# Patient Record
Sex: FEMALE | Race: WHITE | Employment: FULL TIME | URBAN - METROPOLITAN AREA
[De-identification: names, ages, dates, MRNs, and addresses within clinical notes are randomized per-mention and may not be internally consistent; named-entity substitution may affect disease eponyms.]

---

## 2017-01-24 ENCOUNTER — OFFICE VISIT (OUTPATIENT)
Dept: FAMILY MEDICINE | Facility: CLINIC | Age: 52
End: 2017-01-24
Payer: COMMERCIAL

## 2017-01-24 VITALS
HEART RATE: 57 BPM | DIASTOLIC BLOOD PRESSURE: 86 MMHG | RESPIRATION RATE: 10 BRPM | WEIGHT: 167 LBS | OXYGEN SATURATION: 98 % | BODY MASS INDEX: 25.31 KG/M2 | HEIGHT: 68 IN | SYSTOLIC BLOOD PRESSURE: 138 MMHG | TEMPERATURE: 98 F

## 2017-01-24 DIAGNOSIS — Z13.6 CARDIOVASCULAR SCREENING; LDL GOAL LESS THAN 160: ICD-10-CM

## 2017-01-24 DIAGNOSIS — F41.1 GAD (GENERALIZED ANXIETY DISORDER): ICD-10-CM

## 2017-01-24 DIAGNOSIS — Z13.1 SCREENING FOR DIABETES MELLITUS: ICD-10-CM

## 2017-01-24 DIAGNOSIS — Z00.00 ENCOUNTER FOR ROUTINE ADULT HEALTH EXAMINATION WITHOUT ABNORMAL FINDINGS: Primary | ICD-10-CM

## 2017-01-24 DIAGNOSIS — F33.0 MAJOR DEPRESSIVE DISORDER, RECURRENT EPISODE, MILD (H): ICD-10-CM

## 2017-01-24 DIAGNOSIS — Z12.31 ENCOUNTER FOR SCREENING MAMMOGRAM FOR BREAST CANCER: ICD-10-CM

## 2017-01-24 LAB
ALBUMIN SERPL-MCNC: 3.6 G/DL (ref 3.4–5)
ALP SERPL-CCNC: 85 U/L (ref 40–150)
ALT SERPL W P-5'-P-CCNC: 28 U/L (ref 0–50)
ANION GAP SERPL CALCULATED.3IONS-SCNC: 4 MMOL/L (ref 3–14)
AST SERPL W P-5'-P-CCNC: 20 U/L (ref 0–45)
BILIRUB SERPL-MCNC: 0.5 MG/DL (ref 0.2–1.3)
BUN SERPL-MCNC: 8 MG/DL (ref 7–30)
CALCIUM SERPL-MCNC: 8.6 MG/DL (ref 8.5–10.1)
CHLORIDE SERPL-SCNC: 104 MMOL/L (ref 94–109)
CO2 SERPL-SCNC: 32 MMOL/L (ref 20–32)
CREAT SERPL-MCNC: 0.68 MG/DL (ref 0.52–1.04)
GFR SERPL CREATININE-BSD FRML MDRD: NORMAL ML/MIN/1.7M2
GLUCOSE SERPL-MCNC: 87 MG/DL (ref 70–99)
POTASSIUM SERPL-SCNC: 3.6 MMOL/L (ref 3.4–5.3)
PROT SERPL-MCNC: 7.5 G/DL (ref 6.8–8.8)
SODIUM SERPL-SCNC: 140 MMOL/L (ref 133–144)

## 2017-01-24 PROCEDURE — 80053 COMPREHEN METABOLIC PANEL: CPT | Performed by: FAMILY MEDICINE

## 2017-01-24 PROCEDURE — 36415 COLL VENOUS BLD VENIPUNCTURE: CPT | Performed by: FAMILY MEDICINE

## 2017-01-24 PROCEDURE — G0145 SCR C/V CYTO,THINLAYER,RESCR: HCPCS | Performed by: FAMILY MEDICINE

## 2017-01-24 PROCEDURE — 99396 PREV VISIT EST AGE 40-64: CPT | Performed by: FAMILY MEDICINE

## 2017-01-24 PROCEDURE — 87624 HPV HI-RISK TYP POOLED RSLT: CPT | Performed by: FAMILY MEDICINE

## 2017-01-24 RX ORDER — HYDROXYZINE HYDROCHLORIDE 25 MG/1
25 TABLET, FILM COATED ORAL
Qty: 60 TABLET | Refills: 3 | Status: SHIPPED | OUTPATIENT
Start: 2017-01-24 | End: 2019-05-13

## 2017-01-24 RX ORDER — ESCITALOPRAM OXALATE 20 MG/1
20 TABLET ORAL DAILY
Qty: 90 TABLET | Refills: 3 | Status: SHIPPED | OUTPATIENT
Start: 2017-01-24 | End: 2018-04-25

## 2017-01-24 RX ORDER — BUPROPION HYDROCHLORIDE 300 MG/1
300 TABLET ORAL EVERY MORNING
Qty: 90 TABLET | Refills: 3 | Status: SHIPPED | OUTPATIENT
Start: 2017-01-24 | End: 2018-03-05

## 2017-01-24 NOTE — PROGRESS NOTES
Quick Note:    Excellent! Please call or sent a Rico message if you have any questions. Marcy Mclaughlin M.D.         ______

## 2017-01-24 NOTE — PATIENT INSTRUCTIONS
Schedule your mammogram.   Try using a Neti pot for your sinuses.       Preventive Health Recommendations  Female Ages 50 - 64    Yearly exam: See your health care provider every year in order to  o Review health changes.   o Discuss preventive care.    o Review your medicines if your doctor has prescribed any.      Get a Pap test every three years (unless you have an abnormal result and your provider advises testing more often).    If you get Pap tests with HPV test, you only need to test every 5 years, unless you have an abnormal result.     You do not need a Pap test if your uterus was removed (hysterectomy) and you have not had cancer.    You should be tested each year for STDs (sexually transmitted diseases) if you're at risk.     Have a mammogram every 1 to 2 years.    Have a colonoscopy at age 50, or have a yearly FIT test (stool test). These exams screen for colon cancer.      Have a cholesterol test every 5 years, or more often if advised.    Have a diabetes test (fasting glucose) every three years. If you are at risk for diabetes, you should have this test more often.     If you are at risk for osteoporosis (brittle bone disease), think about having a bone density scan (DEXA).    Shots: Get a flu shot each year. Get a tetanus shot every 10 years.    Nutrition:     Eat at least 5 servings of fruits and vegetables each day.    Eat whole-grain bread, whole-wheat pasta and brown rice instead of white grains and rice.    Talk to your provider about Calcium and Vitamin D.     Lifestyle    Exercise at least 150 minutes a week (30 minutes a day, 5 days a week). This will help you control your weight and prevent disease.    Limit alcohol to one drink per day.    No smoking.     Wear sunscreen to prevent skin cancer.     See your dentist every six months for an exam and cleaning.    See your eye doctor every 1 to 2 years.

## 2017-01-24 NOTE — PROGRESS NOTES
SUBJECTIVE:     CC: Rehana Llamas is an 51 year old woman who presents for preventive health visit.     Physical  Annual:     Getting at least 3 servings of Calcium per day::  NO    Bi-annual eye exam::  Yes    Dental care twice a year::  NO    Sleep apnea or symptoms of sleep apnea::  None    Diet::  Vegetarian/vegan    Frequency of exercise::  6-7 days/week    Duration of exercise::  45-60 minutes    Taking medications regularly::  Yes    Medication side effects::  None    Additional concerns today::  No    Answers for HPI/ROS submitted by the patient on 1/24/2017   PHQ-2 Depressed: Not at all, Several days  PHQ-2 Score: 1      Patient's sinuses have been giving her problems. She is treating her symptoms with OTC products and remedies.     Patient is not taking K-DUR.      Today's PHQ-2 Score:   PHQ-2 ( 1999 Pfizer) 1/24/2017   Q1: Little interest or pleasure in doing things 0   Q2: Feeling down, depressed or hopeless 1   PHQ-2 Score 1   Little interest or pleasure in doing things Not at all   Feeling down, depressed or hopeless Several days   PHQ-2 Score 1     Abuse: Current or Past(Physical, Sexual or Emotional)- NOT APPLICABLE  Do you feel safe in your environment - NOT APPLICABLE    Social History   Substance Use Topics     Smoking status: Never Smoker      Smokeless tobacco: Never Used     Alcohol Use: Yes      Comment: 1-2 beers per day     The patient does not drink >3 drinks per day nor >7 drinks per week.    Recent Labs   Lab Test  08/09/11   0823   CHOL  136   HDL  61   LDL  61   TRIG  70   CHOLHDLRATIO  2.2   Reviewed orders with patient.  Reviewed health maintenance and updated orders accordingly - Yes    Mammo Decision Support:  Patient over age 50, mutual decision to screen reflected in health maintenance.  Pertinent mammograms are reviewed under the imaging tab.  History of abnormal Pap smear: NO - age 30-65 PAP every 3 years with negative HPV co-testing recommended  All Histories reviewed  and updated in Epic.      ROS:  14 point ROS neg other than the symptoms noted above in the HPI.    This document serves as a record of the services and decisions personally performed and made by Marcy Mclaughlin MD. It was created on his/her behalf by La Drake, trained medical scribe. The creation of this document is based the provider's statements to the medical scribes.    Scribe La Drake, January 24, 2017    BP Readings from Last 3 Encounters:   01/24/17 138/86   04/12/16 120/70   07/20/15 110/64    Wt Readings from Last 3 Encounters:   01/24/17 75.751 kg (167 lb)   04/12/16 73.029 kg (161 lb)   07/20/15 67.813 kg (149 lb 8 oz)         Patient Active Problem List   Diagnosis     CARDIOVASCULAR SCREENING; LDL GOAL LESS THAN 160     Mild major depression (H)     Benign heart murmur     JHONY (generalized anxiety disorder)     Female stress incontinence     Past Surgical History   Procedure Laterality Date     No surgeries         Social History   Substance Use Topics     Smoking status: Never Smoker      Smokeless tobacco: Never Used     Alcohol Use: Yes      Comment: 1-2 beers per day     Family History   Problem Relation Age of Onset     Depression Father      Depression Sister          Current Outpatient Prescriptions   Medication Sig Dispense Refill     escitalopram (LEXAPRO) 20 MG tablet Take 1 tablet (20 mg) by mouth daily 90 tablet 0     buPROPion (WELLBUTRIN XL) 300 MG 24 hr tablet Take 1 tablet (300 mg) by mouth every morning 90 tablet 1     hydrOXYzine (ATARAX) 25 MG tablet Take 1 tablet (25 mg) by mouth nightly as needed for anxiety 60 tablet 0     potassium chloride (K-DUR) 10 MEQ tablet Take 1 tablet (10 mEq) by mouth daily 90 tablet 1     No Known Allergies  Recent Labs   Lab Test  07/30/15   1758  07/27/15   1548  07/20/15   1628   11/18/13   1612  08/09/11   0823   LDL   --    --    --    --    --   61   HDL   --    --    --    --    --   61   TRIG   --    --    --    --    --   70   ALT  "  --    --   26   --    --    --    CR   --    --   0.66   --    --    --    GFRESTIMATED   --    --   >90  Non  GFR Calc     --    --    --    GFRESTBLACK   --    --   >90   GFR Calc     --    --    --    POTASSIUM  3.8  3.1*  3.0*   < >   --    --    TSH   --    --    --    --   1.35  1.42    < > = values in this interval not displayed.      OBJECTIVE:     /86 mmHg  Pulse 57  Temp(Src) 98  F (36.7  C) (Oral)  Resp 10  Ht 1.727 m (5' 8\")  Wt 75.751 kg (167 lb)  BMI 25.40 kg/m2  SpO2 98%   EXAM:   GENERAL: healthy, alert and no distress  EYES: Eyes grossly normal to inspection, PERRL and conjunctivae and sclerae normal  HENT: ear canals and TM's normal, nose and mouth without ulcers or lesions  NECK: no adenopathy, no asymmetry, masses, or scars and thyroid normal to palpation  RESP: lungs clear to auscultation - no rales, rhonchi or wheezes  BREAST: normal without masses, tenderness or nipple discharge and no palpable axillary masses or adenopathy  CV: regular rate and rhythm, normal S1 S2, no S3 or S4, no murmur, click or rub, no peripheral edema and peripheral pulses strong  ABDOMEN: soft, nontender, no hepatosplenomegaly, no masses and bowel sounds normal   (female): normal female external genitalia, normal urethral meatus, vaginal mucosa pink, moist, well rugated  MS: no gross musculoskeletal defects noted, no edema  SKIN: no suspicious lesions or rashes  NEURO: Normal strength and tone, mentation intact and speech normal  PSYCH: mentation appears normal, affect normal/bright    ASSESSMENT/PLAN:     1. Encounter for routine adult health examination without abnormal findings  - MAMMO due-- will get scheduled today  Pap today  Colon cancer screening FIT test due 4/2017    2. Major depressive disorder, recurrent episode, mild (H)  Stable. The current medical regimen is effective;  continue present plan and medications.  - escitalopram (LEXAPRO) 20 MG tablet; Take 1 " "tablet (20 mg) by mouth daily  Dispense: 90 tablet; Refill: 3  - buPROPion (WELLBUTRIN XL) 300 MG 24 hr tablet; Take 1 tablet (300 mg) by mouth every morning  Dispense: 90 tablet; Refill: 3  - Comprehensive metabolic panel    3. JHONY (generalized anxiety disorder)   stable  - hydrOXYzine (ATARAX) 25 MG tablet; Take 1 tablet (25 mg) by mouth nightly as needed for anxiety  Dispense: 60 tablet; Refill: 3    4. CARDIOVASCULAR SCREENING; LDL GOAL LESS THAN 160  Lipids when fasting     5. Screening for diabetes mellitus  Glucose when fasting     6. Encounter for screening mammogram for breast cancer  - MA Screening Digital Bilateral; Future      COUNSELING:  Reviewed preventive health counseling, as reflected in patient instructions  BP Screening:   Last 3 BP Readings:    BP Readings from Last 3 Encounters:   01/24/17 138/86   04/12/16 120/70   07/20/15 110/64   The following was recommended to the patient:  Re-screen BP within a year and recommended lifestyle modifications   reports that she has never smoked. She has never used smokeless tobacco.  Estimated body mass index is 25.4 kg/(m^2) as calculated from the following:    Height as of this encounter: 1.727 m (5' 8\").    Weight as of this encounter: 75.751 kg (167 lb).     Counseling Resources:  ATP IV Guidelines  Pooled Cohorts Equation Calculator  Breast Cancer Risk Calculator  FRAX Risk Assessment  ICSI Preventive Guidelines  Dietary Guidelines for Americans, 2010  [employer]'s MyPlate  ASA Prophylaxis  Lung CA Screening      Patient Instructions   Schedule your mammogram.   Try using a Neti pot for your sinuses.       Preventive Health Recommendations  Female Ages 50 - 64    Yearly exam: See your health care provider every year in order to  o Review health changes.   o Discuss preventive care.    o Review your medicines if your doctor has prescribed any.      Get a Pap test every three years (unless you have an abnormal result and your provider advises testing more " often).    If you get Pap tests with HPV test, you only need to test every 5 years, unless you have an abnormal result.     You do not need a Pap test if your uterus was removed (hysterectomy) and you have not had cancer.    You should be tested each year for STDs (sexually transmitted diseases) if you're at risk.     Have a mammogram every 1 to 2 years.    Have a colonoscopy at age 50, or have a yearly FIT test (stool test). These exams screen for colon cancer.      Have a cholesterol test every 5 years, or more often if advised.    Have a diabetes test (fasting glucose) every three years. If you are at risk for diabetes, you should have this test more often.     If you are at risk for osteoporosis (brittle bone disease), think about having a bone density scan (DEXA).    Shots: Get a flu shot each year. Get a tetanus shot every 10 years.    Nutrition:     Eat at least 5 servings of fruits and vegetables each day.    Eat whole-grain bread, whole-wheat pasta and brown rice instead of white grains and rice.    Talk to your provider about Calcium and Vitamin D.     Lifestyle    Exercise at least 150 minutes a week (30 minutes a day, 5 days a week). This will help you control your weight and prevent disease.    Limit alcohol to one drink per day.    No smoking.     Wear sunscreen to prevent skin cancer.     See your dentist every six months for an exam and cleaning.    See your eye doctor every 1 to 2 years.        The information in this document, created by the medical scribe for me, accurately reflects the services I personally performed and the decisions made by me. I have reviewed and approved this document for accuracy prior to leaving the patient care area. 01/24/2017    Marcy Mclaughlin MD  [facility]

## 2017-01-24 NOTE — MR AVS SNAPSHOT
After Visit Summary   1/24/2017    Rehana Llamas    MRN: [id number]           Patient Information     Date Of Birth          1965        Visit Information        Provider Department      1/24/2017 9:40 AM Marcy Mclaughlin MD [facility]        Today's Diagnoses     Encounter for routine adult health examination without abnormal findings    -  1     Major depressive disorder, recurrent episode, mild (H)         JHONY (generalized anxiety disorder)         CARDIOVASCULAR SCREENING; LDL GOAL LESS THAN 160         Screening for diabetes mellitus         Encounter for screening mammogram for breast cancer           Care Instructions    Schedule your mammogram.   Try using a Neti pot for your sinuses.       Preventive Health Recommendations  Female Ages 50 - 64    Yearly exam: See your health care provider every year in order to  o Review health changes.   o Discuss preventive care.    o Review your medicines if your doctor has prescribed any.      Get a Pap test every three years (unless you have an abnormal result and your provider advises testing more often).    If you get Pap tests with HPV test, you only need to test every 5 years, unless you have an abnormal result.     You do not need a Pap test if your uterus was removed (hysterectomy) and you have not had cancer.    You should be tested each year for STDs (sexually transmitted diseases) if you're at risk.     Have a mammogram every 1 to 2 years.    Have a colonoscopy at age 50, or have a yearly FIT test (stool test). These exams screen for colon cancer.      Have a cholesterol test every 5 years, or more often if advised.    Have a diabetes test (fasting glucose) every three years. If you are at risk for diabetes, you should have this test more often.     If you are at risk for osteoporosis (brittle bone disease), think about having a bone density scan (DEXA).    Shots: Get a flu shot each year. Get a tetanus shot every 10  years.    Nutrition:     Eat at least 5 servings of fruits and vegetables each day.    Eat whole-grain bread, whole-wheat pasta and brown rice instead of white grains and rice.    Talk to your provider about Calcium and Vitamin D.     Lifestyle    Exercise at least 150 minutes a week (30 minutes a day, 5 days a week). This will help you control your weight and prevent disease.    Limit alcohol to one drink per day.    No smoking.     Wear sunscreen to prevent skin cancer.     See your dentist every six months for an exam and cleaning.    See your eye doctor every 1 to 2 years.          Follow-ups after your visit        Your next 10 appointments already scheduled     Feb 04, 2017 10:00 AM   MA SCREENING DIGITAL BILATERAL [facility]    [address] [place] [id number] [phone]           Do not use any powder, lotion or deodorant under your arms or on your breast. If you do, we will ask you to remove it before your exam.  Wear comfortable, two-piece clothing.  If you have any allergies, tell your care team.  Bring any previous mammograms from other facilities or have them mailed to the breast center.              Future tests that were ordered for you today     Open Future Orders        Priority Expected Expires Ordered    MA Screening Digital Bilateral Routine  1/24/2018 1/24/2017            Who to contact     If you have questions or need follow up information about today's clinic visit or your schedule please contact [facility] directly at [phone].  Normal or non-critical lab and imaging results will be communicated to you by MyChart, letter or phone within 4 business days after the clinic has received the results. If you do not hear from us within 7 days, please contact the clinic through MyChart or phone. If you have a critical or abnormal lab result, we will notify you by phone as soon as  "possible.  Submit refill requests through [employer] or call your pharmacy and they will forward the refill request to us. Please allow 3 business days for your refill to be completed.          Additional Information About Your Visit        [employer] Information     [employer] gives you secure access to your electronic health record. If you see a primary care provider, you can also send messages to your care team and make appointments. If you have questions, please call your primary care clinic.  If you do not have a primary care provider, please call [phone] and they will assist you.        Care EveryWhere ID     This is your Care EveryWhere ID. This could be used by other organizations to access your [facility] medical records  [id number]        Your Vitals Were     Pulse Temperature Respirations Height BMI (Body Mass Index) Pulse Oximetry    57 98  F (36.7  C) (Oral) 10 5' 8\" (1.727 m) 25.40 kg/m2 98%       Blood Pressure from Last 3 Encounters:   01/24/17 138/86   04/12/16 120/70   07/20/15 110/64    Weight from Last 3 Encounters:   01/24/17 167 lb (75.751 kg)   04/12/16 161 lb (73.029 kg)   07/20/15 149 lb 8 oz (67.813 kg)              We Performed the Following     Comprehensive metabolic panel          Where to get your medicines      These medications were sent to [facility] - [place], [place] - [address]  [address] [facility] [address]     Phone:  [phone]    - buPROPion 300 MG 24 hr tablet  - escitalopram 20 MG tablet  - hydrOXYzine 25 MG tablet       Primary Care Provider Office Phone # Fax #    Marcy Mclaughlin [phone]       [facility] [address]  [facility] [address]        Thank you!     Thank you for choosing [facility]  for your care. Our goal is always to provide you with excellent care. Hearing back from our patients is one way we can continue to improve our services. Please take a few minutes to complete the written " survey that you may receive in the mail after your visit with us. Thank you!             Your Updated Medication List - Protect others around you: Learn how to safely use, store and throw away your medicines at www.disposemymeds.org.          This list is accurate as of: 1/24/17 10:23 AM.  Always use your most recent med list.                   Brand Name Dispense Instructions for use    buPROPion 300 MG 24 hr tablet    WELLBUTRIN XL    90 tablet    Take 1 tablet (300 mg) by mouth every morning       escitalopram 20 MG tablet    LEXAPRO    90 tablet    Take 1 tablet (20 mg) by mouth daily       hydrOXYzine 25 MG tablet    ATARAX    60 tablet    Take 1 tablet (25 mg) by mouth nightly as needed for anxiety       potassium chloride 10 MEQ tablet    K-TAB,KLOR-CON    90 tablet    Take 1 tablet (10 mEq) by mouth daily

## 2017-01-24 NOTE — NURSING NOTE
"Chief Complaint   Patient presents with     Physical       Initial /86 mmHg  Pulse 57  Temp(Src) 98  F (36.7  C) (Oral)  Resp 10  Ht 5' 8\" (1.727 m)  Wt 167 lb (75.751 kg)  BMI 25.40 kg/m2  SpO2 98% Estimated body mass index is 25.4 kg/(m^2) as calculated from the following:    Height as of this encounter: 5' 8\" (1.727 m).    Weight as of this encounter: 167 lb (75.751 kg).  BP completed using cuff size: geoff Guardado MA     "

## 2017-01-26 LAB
COPATH REPORT: NORMAL
PAP: NORMAL

## 2017-01-27 LAB
FINAL DIAGNOSIS: NORMAL
HPV HR 12 DNA CVX QL NAA+PROBE: NEGATIVE
HPV16 DNA SPEC QL NAA+PROBE: NEGATIVE
HPV18 DNA SPEC QL NAA+PROBE: NEGATIVE
SPECIMEN DESCRIPTION: NORMAL

## 2017-09-07 ENCOUNTER — TELEPHONE (OUTPATIENT)
Dept: FAMILY MEDICINE | Facility: CLINIC | Age: 52
End: 2017-09-07

## 2017-09-07 ENCOUNTER — OFFICE VISIT (OUTPATIENT)
Dept: URGENT CARE | Facility: URGENT CARE | Age: 52
End: 2017-09-07
Payer: COMMERCIAL

## 2017-09-07 VITALS
HEART RATE: 57 BPM | WEIGHT: 162 LBS | HEIGHT: 70 IN | BODY MASS INDEX: 23.19 KG/M2 | DIASTOLIC BLOOD PRESSURE: 84 MMHG | SYSTOLIC BLOOD PRESSURE: 122 MMHG | OXYGEN SATURATION: 98 % | TEMPERATURE: 98.5 F
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
## 2017-09-07 PROCEDURE — 99213 OFFICE O/P EST LOW 20 MIN: CPT | Performed by: NURSE PRACTITIONER

## 2017-09-07 NOTE — MR AVS SNAPSHOT
After Visit Summary   9/7/2017    Rehana Llamas    MRN: [id number]           Patient Information     Date Of Birth          1965        Visit Information        Provider Department      9/7/2017 6:30 PM Malinda Horowitz NP [facility]        Today's Diagnoses     Cat scratch    -  1      Care Instructions      Animal Bites and Scratches     Healthcare provider giving injection in man's arm.     Most bites and scratches from household pets are nothing to worry about. But some bites or scratches can be serious. Others may become infected or pose the risk of rabies. For that reason, it's best to seek medical treatment for all but the most minor bites.  Report severe animal bites to animal control or your local health department.   When to go to the emergency room (ER)  A bite that barely breaks the skin usually isn't cause for concern. But seek emergency medical care if you:    Have a deep puncture wound or badly torn skin    Have redness, swelling, or warmth near the wound    Think you may have a broken bone or other serious injury    The attack was unprovoked and you don't know the animal (rabies must be ruled out)    Are bitten by a domestic cat or a wild animal, such as a skunk, raccoon, or bat    Do not have a spleen or have a weak immune system  What to expect in the ER    The wound will be carefully cleaned and inspected.    X-rays may be taken if deep damage is suspected or to make sure a small piece of the animal's tooth is not left embedded in the wound.    Although not common, infection can occur, especially if you have a cat bite, deep wound, or weak immune system. You may be given antibiotics to help prevent this.    You may be given a tetanus shot if you haven't had one in the past 5 years. If rabies is a concern, you may be given shots to protect you.    If serious tissue or joint damage has been done, you may be referred to a plastic or orthopedic  surgeon.  Follow-up care  You will likely need to see your doctor within a day or two of receiving emergency medical treatment. In the meantime, watch for signs of infection. These include:    Fever of 100.4 F (38 C) or higher, or as directed by your healthcare provider    Swelling    Redness    Pus draining from the wound  Date Last Reviewed: 12/1/2016 2000-2017 [employer]. [address] [place] [address], [place] [address]. All rights reserved. This information is not intended as a substitute for professional medical care. Always follow your healthcare professional's instructions.                Follow-ups after your visit        Who to contact     If you have questions or need follow up information about today's clinic visit or your schedule please contact [facility] directly at [phone].  Normal or non-critical lab and imaging results will be communicated to you by MyChart, letter or phone within 4 business days after the clinic has received the results. If you do not hear from us within 7 days, please contact the clinic through [employer] or phone. If you have a critical or abnormal lab result, we will notify you by phone as soon as possible.  Submit refill requests through [employer] or call your pharmacy and they will forward the refill request to us. Please allow 3 business days for your refill to be completed.          Additional Information About Your Visit        [employer] Information     [employer] gives you secure access to your electronic health record. If you see a primary care provider, you can also send messages to your care team and make appointments. If you have questions, please call your primary care clinic.  If you do not have a primary care provider, please call [phone] and they will assist you.        Care EveryWhere ID     This is your Care EveryWhere ID. This could be used by other organizations to access your [place] medical records  [id number]        Your  "Vitals Were     Pulse Temperature Height Pulse Oximetry BMI (Body Mass Index)       57 98.5  F (36.9  C) (Oral) 5' 10\" (1.778 m) 98% 23.24 kg/m2        Blood Pressure from Last 3 Encounters:   09/07/17 122/84   01/24/17 138/86   04/12/16 120/70    Weight from Last 3 Encounters:   09/07/17 162 lb (73.5 kg)   01/24/17 167 lb (75.8 kg)   04/12/16 161 lb (73 kg)              Today, you had the following     No orders found for display         Today's Medication Changes          These changes are accurate as of: 9/7/17  7:46 PM.  If you have any questions, ask your nurse or doctor.               Start taking these medicines.        Dose/Directions    amoxicillin-clavulanate 875-125 MG per tablet   Commonly known as:  AUGMENTIN   Used for:  Cat scratch   Started by:  Malinda Horowitz NP        Dose:  1 tablet   Take 1 tablet by mouth 2 times daily   Quantity:  20 tablet   Refills:  0            Where to get your medicines      These medications were sent to [facility] [address] [place] [address] AT [address] [address]     Phone:  [phone]     amoxicillin-clavulanate 875-125 MG per tablet                Primary Care Provider Office Phone # Fax #    Marcy Mclaughlin [phone]       [address]  [facility] [address]        Equal Access to Services     CAROLINA ORR AH: Mehreen selfo Sochana, waaxda luqadaha, qaybta kaalmada adeegyada, waxay jenn miller aderazia norris. So [facility] [phone].    ATENCIÓN: Si habla español, tiene a jacob disposición servicios gratuitos de asistencia lingüística. Llame al [phone].    We comply with applicable federal civil rights laws and [place] laws. We do not discriminate on the basis of race, color, national origin, age, disability sex, sexual orientation or gender identity.            Thank you!     Thank you for choosing [facility]  for your care. Our goal is " always to provide you with excellent care. Hearing back from our patients is one way we can continue to improve our services. Please take a few minutes to complete the written survey that you may receive in the mail after your visit with us. Thank you!             Your Updated Medication List - Protect others around you: Learn how to safely use, store and throw away your medicines at www.disposemymeds.org.          This list is accurate as of: 9/7/17  7:46 PM.  Always use your most recent med list.                   Brand Name Dispense Instructions for use Diagnosis    amoxicillin-clavulanate 875-125 MG per tablet    AUGMENTIN    20 tablet    Take 1 tablet by mouth 2 times daily    Cat scratch       buPROPion 300 MG 24 hr tablet    WELLBUTRIN XL    90 tablet    Take 1 tablet (300 mg) by mouth every morning    Major depressive disorder, recurrent episode, mild (H)       escitalopram 20 MG tablet    LEXAPRO    90 tablet    Take 1 tablet (20 mg) by mouth daily    Major depressive disorder, recurrent episode, mild (H)       hydrOXYzine 25 MG tablet    ATARAX    60 tablet    Take 1 tablet (25 mg) by mouth nightly as needed for anxiety    JHONY (generalized anxiety disorder)

## 2017-09-07 NOTE — TELEPHONE ENCOUNTER
"Patient states she has an area on her leg where she has A \"depressed vein\"  Last night her cat scratched her in the middle of the night putting like a claw puncture right in to that vein  Had a lot of bleeding  Was finally able to get the bleeding under control.  Is wondering if she should be seen  Currently no signs of infection, but knows it can turn quickly and that the cat's claw did break the skin    Recommend she should be seen.  Last Tetanus vaccine was 8/9/2011  Patient agrees with plan but is currently at work so will plan to go to  this evening at the [facility].  Trudy Alcaraz RN     "

## 2017-09-08 NOTE — NURSING NOTE
"Chief Complaint   Patient presents with     Urgent Care     Laceration     1AM today turned over in bed, her own cat was laying beside her, pt got up to go to BR and suddenly noted blood squirting from right upper outer thigh.  Most recent tetanus vaccination was 8/2011.     Initial /84  Pulse 57  Temp 98.5  F (36.9  C) (Oral)  Ht 5' 10\" (1.778 m)  Wt 162 lb (73.5 kg)  SpO2 98%  BMI 23.24 kg/m2 Estimated body mass index is 23.24 kg/(m^2) as calculated from the following:    Height as of this encounter: 5' 10\" (1.778 m).    Weight as of this encounter: 162 lb (73.5 kg)..  BP completed using cuff size: karthikeyan Fried RN  "

## 2017-09-08 NOTE — PROGRESS NOTES
"SUBJECTIVE:  Rehana Llamas is a 52 year old female who presents to the clinic today for a right upper lateral thigh poked this morning by patient's cat and had a large amount of blood coming out which stopped in 5-10 minutes.    Patient tetanus is 8/2011      Recent exposure history: none known    Associated symptoms include:    Past Medical History:   Diagnosis Date     Benign heart murmur     checked in 2006, told benign     CARDIOVASCULAR SCREENING; LDL GOAL LESS THAN 160 8/9/2011     Mild major depression (H) 8/9/2011     Current Outpatient Prescriptions   Medication Sig Dispense Refill     escitalopram (LEXAPRO) 20 MG tablet Take 1 tablet (20 mg) by mouth daily 90 tablet 3     buPROPion (WELLBUTRIN XL) 300 MG 24 hr tablet Take 1 tablet (300 mg) by mouth every morning 90 tablet 3     hydrOXYzine (ATARAX) 25 MG tablet Take 1 tablet (25 mg) by mouth nightly as needed for anxiety (Patient not taking: Reported on 9/7/2017) 60 tablet 3     Social History   Substance Use Topics     Smoking status: Never Smoker     Smokeless tobacco: Never Used     Alcohol use Yes      Comment: 1-2 beers per day     ROS:  CONSTITUTIONAL:NEGATIVE for fever, chills, change in weight  ENT/MOUTH: NEGATIVE for ear, mouth and throat problems  RESP:NEGATIVE for significant cough or SOB  GI: NEGATIVE for nausea, abdominal pain, heartburn, or change in bowel habits    EXAM:   /84  Pulse 57  Temp 98.5  F (36.9  C) (Oral)  Ht 5' 10\" (1.778 m)  Wt 162 lb (73.5 kg)  SpO2 98%  BMI 23.24 kg/m2  GENERAL: alert, no acute distress.  SKIN: Right upper lateral thigh puncture on prominent vein with ecchymosis of 2 cm by 2 cm   GENERAL APPEARANCE: healthy, alert and no distress  EYES: EOMI,  PERRL, conjunctiva clear  RESP: lungs clear to auscultation - no rales, rhonchi or wheezes  CV: regular rates and rhythm, normal S1 S2, no murmur noted\"}    ASSESSMENT: Puncture by Cat     PLAN:  Augmentin 875 mg one tablet twice a day for 10 days " amount 20   Monitor for signs and symptoms of infection redness swelling drainage or increase warmth return to clinic as soon as possible and (marked patient with purple marker of the outline)  1) See today's orders.  2) Follow-up with primary clinic if not improving

## 2017-09-08 NOTE — PATIENT INSTRUCTIONS
Animal Bites and Scratches     Healthcare provider giving injection in man's arm.     Most bites and scratches from household pets are nothing to worry about. But some bites or scratches can be serious. Others may become infected or pose the risk of rabies. For that reason, it's best to seek medical treatment for all but the most minor bites.  Report severe animal bites to animal control or your local health department.   When to go to the emergency room (ER)  A bite that barely breaks the skin usually isn't cause for concern. But seek emergency medical care if you:    Have a deep puncture wound or badly torn skin    Have redness, swelling, or warmth near the wound    Think you may have a broken bone or other serious injury    The attack was unprovoked and you don't know the animal (rabies must be ruled out)    Are bitten by a domestic cat or a wild animal, such as a skunk, raccoon, or bat    Do not have a spleen or have a weak immune system  What to expect in the ER    The wound will be carefully cleaned and inspected.    X-rays may be taken if deep damage is suspected or to make sure a small piece of the animal's tooth is not left embedded in the wound.    Although not common, infection can occur, especially if you have a cat bite, deep wound, or weak immune system. You may be given antibiotics to help prevent this.    You may be given a tetanus shot if you haven't had one in the past 5 years. If rabies is a concern, you may be given shots to protect you.    If serious tissue or joint damage has been done, you may be referred to a plastic or orthopedic surgeon.  Follow-up care  You will likely need to see your doctor within a day or two of receiving emergency medical treatment. In the meantime, watch for signs of infection. These include:    Fever of 100.4 F (38 C) or higher, or as directed by your healthcare provider    Swelling    Redness    Pus draining from the wound  Date Last Reviewed: 12/1/2016     9720-9899 [employer]. [address] [place] [address], [place] [address]. All rights reserved. This information is not intended as a substitute for professional medical care. Always follow your healthcare professional's instructions.

## 2018-03-05 DIAGNOSIS — F33.0 MAJOR DEPRESSIVE DISORDER, RECURRENT EPISODE, MILD (H): ICD-10-CM

## 2018-03-07 NOTE — TELEPHONE ENCOUNTER
"Requested Prescriptions   Pending Prescriptions Disp Refills     buPROPion (WELLBUTRIN XL) 300 MG 24 hr tablet [Pharmacy Med Name: BUPROPION HCL ER (XL) 300MG TB24]  Last Written Prescription Date:  1/24/17  Last Fill Quantity: 90 TABLET,  # refills: 3   Last office visit: 10/17/2017 with prescribing provider:  HOUSE   Future Office Visit:     90 tablet 3     Sig: TAKE ONE TABLET BY MOUTH EVERY MORNING    SSRIs Protocol Failed    3/5/2018  6:56 PM       Failed - PHQ-9 score less than 5 in past 6 months    The PHQ-9 criteria is meant to fail. It requires a PHQ-9 score review  PHQ-9 SCORE 10/13/2015 4/12/2016 11/16/2016   Total Score - - -   Total Score 6 4 4     JHONY-7 SCORE 12/3/2014 10/13/2015 4/12/2016   Total Score 0 - -   Total Score - 8 11                Failed - Recent (6 mo) or future (30 days) visit within the authorizing provider's specialty    Patient had office visit in the last 6 months or has a visit in the next 30 days with authorizing provider.  See \"Patient Info\" tab in inbasket, or \"Choose Columns\" in Meds & Orders section of the refill encounter.           Passed - Medication is Bupropion    If the medication is Bupropion (Wellbutrin), and the patient is taking for smoking cessation; OK to refill.         Passed - Patient is age 18 or older       Passed - No active pregnancy on record       Passed - No positive pregnancy test in last 12 months          "

## 2018-03-08 RX ORDER — BUPROPION HYDROCHLORIDE 300 MG/1
TABLET ORAL
Qty: 30 TABLET | Refills: 0 | Status: SHIPPED | OUTPATIENT
Start: 2018-03-08 | End: 2018-04-25

## 2018-03-08 NOTE — TELEPHONE ENCOUNTER
One month refill only as patient overdue for annual office visit.    Team coordinators-Please contact patient to schedule annual office visit.    Thank you!  TENA DamonN, RN

## 2018-04-23 NOTE — PROGRESS NOTES
SUBJECTIVE:   Rehana Llamas is a 53 year old female who presents to clinic today for the following health issues:      Depression and Anxiety Follow-Up    Status since last visit: Improved depression and anxiety is worsening     Other associated symptoms:None    Complicating factors:     Significant life event: No     Current substance abuse: None    PHQ-9 4/12/2016 11/16/2016 4/25/2018   Total Score 4 4 7   Q9: Suicide Ideation Not at all Not at all Not at all     JHONY-7 SCORE 10/13/2015 4/12/2016 4/25/2018   Total Score - - -   Total Score - - 13 (moderate anxiety)   Total Score 8 11 13        PHQ-9  English  PHQ-9   Any Language  JHONY-7  Suicide Assessment Five-step Evaluation and Treatment (SAFE-T)    Answers for HPI/ROS submitted by the patient on 4/25/2018   If you checked off any problems, how difficult have these problems made it for you to do your work, take care of things at home, or get along with other people?: Somewhat difficult  PHQ9 TOTAL SCORE: 7  JHONY 7 TOTAL SCORE: 13    Problem list and histories reviewed & adjusted, as indicated.  Additional history: as documented    She is working as a  at [employer] and she states that there had been some stress at work, but it is all resolved. She is having difficulty working out and such as she is working full time, which also hinders her ability to come into the clinic for visits. She is open to seeing Yoseph Arnold again. She also states that she broke her bottom denture and so she doesn't not have any in place and that she is having difficulty eating and chewing. She plans on scheduling with her dentist.     She is still having some anxiety and she is not taking her hydroxyzine during the day as it makes her drowsy. At night if she has a panic attack she will take her medication. Denies any suicidal ideation.    She has not been doing as much of her previous ballet and piliates workouts, but running occasionally indoors.       Patient  Active Problem List   Diagnosis     CARDIOVASCULAR SCREENING; LDL GOAL LESS THAN 160     Mild major depression (H)     Benign heart murmur     JHONY (generalized anxiety disorder)     Female stress incontinence     Past Surgical History:   Procedure Laterality Date     no surgeries         Social History   Substance Use Topics     Smoking status: Never Smoker     Smokeless tobacco: Never Used     Alcohol use Yes      Comment: 1 beers per day     Family History   Problem Relation Age of Onset     Depression Father      Depression Sister          Current Outpatient Prescriptions   Medication Sig Dispense Refill     buPROPion (WELLBUTRIN XL) 300 MG 24 hr tablet Take 1 tablet (300 mg) by mouth every morning 90 tablet 3     escitalopram (LEXAPRO) 20 MG tablet Take 1 tablet (20 mg) by mouth daily 90 tablet 3     hydrOXYzine (ATARAX) 25 MG tablet Take 1 tablet (25 mg) by mouth nightly as needed for anxiety 60 tablet 3     [DISCONTINUED] buPROPion (WELLBUTRIN XL) 300 MG 24 hr tablet TAKE ONE TABLET BY MOUTH EVERY MORNING 30 tablet 0     [DISCONTINUED] escitalopram (LEXAPRO) 20 MG tablet Take 1 tablet (20 mg) by mouth daily 90 tablet 3     No Known Allergies  Recent Labs   Lab Test  01/24/17   1021  07/30/15   1758   07/20/15   1628  11/18/13   1612  08/09/11   0823   LDL   --    --    --    --    --   61   HDL   --    --    --    --    --   61   TRIG   --    --    --    --    --   70   ALT  28   --    --   26   --    --    CR  0.68   --    --   0.66   --    --    GFRESTIMATED  >90  Non  GFR Calc     --    --   >90  Non  GFR Calc     --    --    GFRESTBLACK  >90   GFR Calc     --    --   >90   GFR Calc     --    --    POTASSIUM  3.6  3.8   < >  3.0*   --    --    TSH   --    --    --    --   1.35  1.42    < > = values in this interval not displayed.      BP Readings from Last 3 Encounters:   04/25/18 122/85   09/07/17 122/84   01/24/17 138/86    Wt Readings from  Last 3 Encounters:   04/25/18 163 lb 12 oz (74.3 kg)   09/07/17 162 lb (73.5 kg)   01/24/17 167 lb (75.8 kg)        Reviewed and updated as needed this visit by clinical staff  Tobacco  Allergies  Meds  Med Hx  Surg Hx  Fam Hx  Soc Hx      Reviewed and updated as needed this visit by Provider       ROS:  See above    This document serves as a record of the services and decisions personally performed and made by Marcy Mclaughlin MD. It was created on his/her behalf by Valeria Tamayo, trained medical scribe. The creation of this document is based the provider's statements to the medical scribes.    Scribe Valeria Tamayo, April 25, 2018  OBJECTIVE:     /85 (Cuff Size: Adult Regular)  Pulse 62  Temp 98.6  F (37  C) (Oral)  Wt 163 lb 12 oz (74.3 kg)  SpO2 98%  BMI 23.5 kg/m2  Body mass index is 23.5 kg/(m^2).     GENERAL: healthy, alert and no distress  PSYCH: mentation appears normal, affect normal/bright    Diagnostic Test Results:  No results found for this or any previous visit (from the past 24 hour(s)).    ASSESSMENT/PLAN:      1. Mild major depression (H)  Controlled   - DEPRESSION ACTION PLAN (DAP)  - buPROPion (WELLBUTRIN XL) 300 MG 24 hr tablet; Take 1 tablet (300 mg) by mouth every morning  Dispense: 90 tablet; Refill: 3  - escitalopram (LEXAPRO) 20 MG tablet; Take 1 tablet (20 mg) by mouth daily  Dispense: 90 tablet; Refill: 3    2. JHONY (generalized anxiety disorder)  Some increase in anxiety, she is interested in seeing Toño Arnold.   - buPROPion (WELLBUTRIN XL) 300 MG 24 hr tablet; Take 1 tablet (300 mg) by mouth every morning  Dispense: 90 tablet; Refill: 3  - escitalopram (LEXAPRO) 20 MG tablet; Take 1 tablet (20 mg) by mouth daily  Dispense: 90 tablet; Refill: 3    3. Encounter for screening mammogram for breast cancer     - MA Screening Digital Bilateral; Future    4. Colon cancer screening     - Fecal colorectal cancer screen FIT; Future     Patient Instructions   1) Schedule with  Toño Arnold.   2) I sent refills of your medications for the year   3) Schedule your mammogram  4) Return your FIT test.        The information in this document, created by the medical scribe for me, accurately reflects the services I personally performed and the decisions made by me. I have reviewed and approved this document for accuracy. 04/25/18    Marcy Mclaughlin MD  [facility]

## 2018-04-25 ENCOUNTER — OFFICE VISIT (OUTPATIENT)
Dept: FAMILY MEDICINE | Facility: CLINIC | Age: 53
End: 2018-04-25
Payer: COMMERCIAL

## 2018-04-25 VITALS
BODY MASS INDEX: 23.5 KG/M2 | OXYGEN SATURATION: 98 % | TEMPERATURE: 98.6 F | HEART RATE: 62 BPM | DIASTOLIC BLOOD PRESSURE: 85 MMHG | SYSTOLIC BLOOD PRESSURE: 122 MMHG | WEIGHT: 163.75 LBS

## 2018-04-25 DIAGNOSIS — Z12.31 ENCOUNTER FOR SCREENING MAMMOGRAM FOR BREAST CANCER: ICD-10-CM

## 2018-04-25 DIAGNOSIS — F32.0 MILD MAJOR DEPRESSION (H): Primary | ICD-10-CM

## 2018-04-25 DIAGNOSIS — Z12.11 COLON CANCER SCREENING: ICD-10-CM

## 2018-04-25 DIAGNOSIS — F41.1 GAD (GENERALIZED ANXIETY DISORDER): ICD-10-CM

## 2018-04-25 PROCEDURE — 99214 OFFICE O/P EST MOD 30 MIN: CPT | Performed by: FAMILY MEDICINE

## 2018-04-25 RX ORDER — ESCITALOPRAM OXALATE 20 MG/1
20 TABLET ORAL DAILY
Qty: 90 TABLET | Refills: 3 | Status: SHIPPED | OUTPATIENT
Start: 2018-04-25 | End: 2019-05-01

## 2018-04-25 RX ORDER — BUPROPION HYDROCHLORIDE 300 MG/1
300 TABLET ORAL EVERY MORNING
Qty: 90 TABLET | Refills: 3 | Status: SHIPPED | OUTPATIENT
Start: 2018-04-25 | End: 2019-05-06

## 2018-04-25 ASSESSMENT — ANXIETY QUESTIONNAIRES
1. FEELING NERVOUS, ANXIOUS, OR ON EDGE: MORE THAN HALF THE DAYS
6. BECOMING EASILY ANNOYED OR IRRITABLE: SEVERAL DAYS
7. FEELING AFRAID AS IF SOMETHING AWFUL MIGHT HAPPEN: MORE THAN HALF THE DAYS
4. TROUBLE RELAXING: MORE THAN HALF THE DAYS
2. NOT BEING ABLE TO STOP OR CONTROL WORRYING: MORE THAN HALF THE DAYS
GAD7 TOTAL SCORE: 13
3. WORRYING TOO MUCH ABOUT DIFFERENT THINGS: MORE THAN HALF THE DAYS
7. FEELING AFRAID AS IF SOMETHING AWFUL MIGHT HAPPEN: MORE THAN HALF THE DAYS
GAD7 TOTAL SCORE: 13
5. BEING SO RESTLESS THAT IT IS HARD TO SIT STILL: MORE THAN HALF THE DAYS
GAD7 TOTAL SCORE: 13

## 2018-04-25 ASSESSMENT — PATIENT HEALTH QUESTIONNAIRE - PHQ9
SUM OF ALL RESPONSES TO PHQ QUESTIONS 1-9: 7
SUM OF ALL RESPONSES TO PHQ QUESTIONS 1-9: 7
10. IF YOU CHECKED OFF ANY PROBLEMS, HOW DIFFICULT HAVE THESE PROBLEMS MADE IT FOR YOU TO DO YOUR WORK, TAKE CARE OF THINGS AT HOME, OR GET ALONG WITH OTHER PEOPLE: SOMEWHAT DIFFICULT

## 2018-04-25 NOTE — LETTER
My Depression Action Plan  Name: Rehana Llamas   Date of Birth 1965  Date: 4/25/2018    My doctor: Marcy Mclaughlin   My clinic: [address] [address] [phone]          GREEN    ZONE   Good Control    What it looks like:     Things are going generally well. You have normal up s and down s. You may even feel depressed from time to time, but bad moods usually last less than a day.   What you need to do:  1. Continue to care for yourself (see self care plan)  2. Check your depression survival kit and update it as needed  3. Follow your physician s recommendations including any medication.  4. Do not stop taking medication unless you consult with your physician first.           YELLOW         ZONE Getting Worse    What it looks like:     Depression is starting to interfere with your life.     It may be hard to get out of bed; you may be starting to isolate yourself from others.    Symptoms of depression are starting to last most all day and this has happened for several days.     You may have suicidal thoughts but they are not constant.   What you need to do:     1. Call your care team, your response to treatment will improve if you keep your care team informed of your progress. Yellow periods are signs an adjustment may need to be made.     2. Continue your self-care, even if you have to fake it!    3. Talk to someone in your support network    4. Open up your depression survival kit           RED    ZONE Medical Alert - Get Help    What it looks like:     Depression is seriously interfering with your life.     You may experience these or other symptoms: You can t get out of bed most days, can t work or engage in other necessary activities, you have trouble taking care of basic hygiene, or basic responsibilities, thoughts of suicide or death that will not go away, self-injurious behavior.     What you need to do:  1. Call your care team  and request a same-day appointment. If they are not available (weekends or after hours) call your local crisis line, emergency room or 911.            Depression Self Care Plan / Survival Kit    Self-Care for Depression  Here s the deal. Your body and mind are really not as separate as most people think.  What you do and think affects how you feel and how you feel influences what you do and think. This means if you do things that people who feel good do, it will help you feel better.  Sometimes this is all it takes.  There is also a place for medication and therapy depending on how severe your depression is, so be sure to consult with your medical provider and/ or Behavioral Health Consultant if your symptoms are worsening or not improving.     In order to better manage my stress, I will:    Exercise  Get some form of exercise, every day. This will help reduce pain and release endorphins, the  feel good  chemicals in your brain. This is almost as good as taking antidepressants!  This is not the same as joining a gym and then never going! (they count on that by the way ) It can be as simple as just going for a walk or doing some gardening, anything that will get you moving.      Hygiene   Maintain good hygiene (Get out of bed in the morning, Make your bed, Brush your teeth, Take a shower, and Get dressed like you were going to work, even if you are unemployed).  If your clothes don't fit try to get ones that do.    Diet  I will strive to eat foods that are good for me, drink plenty of water, and avoid excessive sugar, caffeine, alcohol, and other mood-altering substances.  Some foods that are helpful in depression are: complex carbohydrates, B vitamins, flaxseed, fish or fish oil, fresh fruits and vegetables.    Psychotherapy  I agree to participate in Individual Therapy (if recommended).    Medication  If prescribed medications, I agree to take them.  Missing doses can result in serious side effects.  I understand  that drinking alcohol, or other illicit drug use, may cause potential side effects.  I will not stop my medication abruptly without first discussing it with my provider.    Staying Connected With Others  I will stay in touch with my friends, family members, and my primary care provider/team.    Use your imagination  Be creative.  We all have a creative side; it doesn t matter if it s oil painting, sand castles, or mud pies! This will also kick up the endorphins.    Witness Beauty  (AKA stop and smell the roses) Take a look outside, even in mid-winter. Notice colors, textures. Watch the squirrels and birds.     Service to others  Be of service to others.  There is always someone else in need.  By helping others we can  get out of ourselves  and remember the really important things.  This also provides opportunities for practicing all the other parts of the program.    Humor  Laugh and be silly!  Adjust your TV habits for less news and crime-drama and more comedy.    Control your stress  Try breathing deep, massage therapy, biofeedback, and meditation. Find time to relax each day.     My support system    Clinic Contact:  Phone number:    Contact 1:  Phone number:    Contact 2:  Phone number:    [employer]/:  Phone number:    Therapist:  Phone number:    Local crisis center:    Phone number:    Other community support:  Phone number:

## 2018-04-25 NOTE — MR AVS SNAPSHOT
"              After Visit Summary   4/25/2018    Rehana Llamas    MRN: [id number]           Patient Information     Date Of Birth          1965        Visit Information        Provider Department      4/25/2018 9:20 AM Marcy Mclaughlin MD [facility]        Today's Diagnoses     Mild major depression (H)    -  1    JHONY (generalized anxiety disorder)        Encounter for screening mammogram for breast cancer        Colon cancer screening          Care Instructions    1) Schedule with Toño Arnold.   2) I sent refills of your medications for the year   3) Schedule your mammogram  4) Return your FIT test.            Follow-ups after your visit        Your next 10 appointments already scheduled     Apr 28, 2018 11:45 AM CDT   (Arrive by 11:30 AM)   MA SCREENING DIGITAL BILATERAL with EAMA1   [facility] [place] ([facility]    [address] ,[address]  [place] [address] [phone]           Do not use any powder, lotion or deodorant under your arms or on your breast. If you do, we will ask you to remove it before your exam.  Wear comfortable, two-piece clothing.  If you have any allergies, tell your care team.  Bring any previous mammograms from other facilities or have them mailed to the breast center. Three-dimensional (3D) mammograms are available at [place] locations in [facility], [facility], [facility], [place], [place], and [place]. [facility] locations include [place] [facility] in [place]. Benefits of 3D mammograms include: - Improved rate of cancer detection - Decreases your chance of having to go back for more tests, which means fewer: - \"False-positive\" results (This means that there is an abnormal area but it isn't cancer.) - Invasive testing procedures, such as a biopsy or surgery - Can provide clearer images of the breast if you have dense breast tissue. 3D mammography is an optional exam that anyone " can have with a 2D mammogram. It doesn't replace or take the place of a 2D mammogram. 2D mammograms remain an effective screening test for all women.  Not all insurance companies cover the cost of a 3D mammogram. Check with your insurance.              Future tests that were ordered for you today     Open Future Orders        Priority Expected Expires Ordered    Fecal colorectal cancer screen FIT Routine 5/16/2018 7/18/2018 4/25/2018    MA Screening Digital Bilateral Routine  4/25/2019 4/25/2018            Who to contact     If you have questions or need follow up information about today's clinic visit or your schedule please contact [facility] directly at [phone].  Normal or non-critical lab and imaging results will be communicated to you by [facility] Inchart, letter or phone within 4 business days after the clinic has received the results. If you do not hear from us within 7 days, please contact the clinic through [employer] or phone. If you have a critical or abnormal lab result, we will notify you by phone as soon as possible.  Submit refill requests through [employer] or call your pharmacy and they will forward the refill request to us. Please allow 3 business days for your refill to be completed.          Additional Information About Your Visit        [employer] Information     [employer] gives you secure access to your electronic health record. If you see a primary care provider, you can also send messages to your care team and make appointments. If you have questions, please call your primary care clinic.  If you do not have a primary care provider, please call [phone] and they will assist you.        Care EveryWhere ID     This is your Care EveryWhere ID. This could be used by other organizations to access your [facility] medical records  [id number]        Your Vitals Were     Pulse Temperature Pulse Oximetry BMI (Body Mass Index)          62 98.6  F (37  C) (Oral) 98% 23.5 kg/m2         Blood Pressure  from Last 3 Encounters:   04/25/18 122/85   09/07/17 122/84   01/24/17 138/86    Weight from Last 3 Encounters:   04/25/18 163 lb 12 oz (74.3 kg)   09/07/17 162 lb (73.5 kg)   01/24/17 167 lb (75.8 kg)              We Performed the Following     DEPRESSION ACTION PLAN (DAP)          Today's Medication Changes          These changes are accurate as of 4/25/18 10:21 AM.  If you have any questions, ask your nurse or doctor.               These medicines have changed or have updated prescriptions.        Dose/Directions    buPROPion 300 MG 24 hr tablet   Commonly known as:  WELLBUTRIN XL   This may have changed:  See the new instructions.   Used for:  Mild major depression (H), JHONY (generalized anxiety disorder)   Changed by:  Marcy Mclaughlin MD        Dose:  300 mg   Take 1 tablet (300 mg) by mouth every morning   Quantity:  90 tablet   Refills:  3            Where to get your medicines      These medications were sent to [facility] [address]  [address] [address]     Phone:  [phone]     buPROPion 300 MG 24 hr tablet    escitalopram 20 MG tablet                Primary Care Provider Office Phone # Fax #    Marcy Mclaughlin [phone]       [address]  [facility] [address]        Equal Access to Services     [facility] Hospital[facility] : Hadii jagdish crump hadasho Soomaali, waaxda luqadaha, qaybta kaalmada adeegyada, anadn norris. So [facility] [phone].    ATENCIÓN: Si habla español, tiene a jacob disposición servicios gratuitos de asistencia lingüística. Llame al [phone].    We comply with applicable federal civil rights laws and [place] laws. We do not discriminate on the basis of race, color, national origin, age, disability, sex, sexual orientation, or gender identity.            Thank you!     Thank you for choosing [facility]  for your care. Our goal is always to provide you with excellent care. Hearing  back from our patients is one way we can continue to improve our services. Please take a few minutes to complete the written survey that you may receive in the mail after your visit with us. Thank you!             Your Updated Medication List - Protect others around you: Learn how to safely use, store and throw away your medicines at www.disposemymeds.org.          This list is accurate as of 4/25/18 10:21 AM.  Always use your most recent med list.                   Brand Name Dispense Instructions for use Diagnosis    buPROPion 300 MG 24 hr tablet    WELLBUTRIN XL    90 tablet    Take 1 tablet (300 mg) by mouth every morning    Mild major depression (H), JHONY (generalized anxiety disorder)       escitalopram 20 MG tablet    LEXAPRO    90 tablet    Take 1 tablet (20 mg) by mouth daily    Mild major depression (H), JHONY (generalized anxiety disorder)       hydrOXYzine 25 MG tablet    ATARAX    60 tablet    Take 1 tablet (25 mg) by mouth nightly as needed for anxiety    JHONY (generalized anxiety disorder)

## 2018-04-25 NOTE — PATIENT INSTRUCTIONS
1) Schedule with Toño Arnold.   2) I sent refills of your medications for the year   3) Schedule your mammogram  4) Return your FIT test.

## 2018-04-26 ASSESSMENT — PATIENT HEALTH QUESTIONNAIRE - PHQ9: SUM OF ALL RESPONSES TO PHQ QUESTIONS 1-9: 7

## 2018-04-26 ASSESSMENT — ANXIETY QUESTIONNAIRES: GAD7 TOTAL SCORE: 13

## 2018-04-28 ENCOUNTER — RADIANT APPOINTMENT (OUTPATIENT)
Dept: MAMMOGRAPHY | Facility: CLINIC | Age: 53
End: 2018-04-28
Payer: COMMERCIAL

## 2018-04-28 DIAGNOSIS — Z12.31 ENCOUNTER FOR SCREENING MAMMOGRAM FOR BREAST CANCER: ICD-10-CM

## 2018-04-28 PROCEDURE — 77067 SCR MAMMO BI INCL CAD: CPT | Mod: TC

## 2019-05-01 DIAGNOSIS — F32.0 MILD MAJOR DEPRESSION (H): ICD-10-CM

## 2019-05-01 DIAGNOSIS — F41.1 GAD (GENERALIZED ANXIETY DISORDER): ICD-10-CM

## 2019-05-02 NOTE — TELEPHONE ENCOUNTER
Routing refill request to provider for review/approval because:  --Overdue for annual office visit and PHQ-9.  --Last PHQ-9 >4.     ,  --Please contact patient and ask her to schedule an annual office visit with .    --A refill request for below medication was sent to the provider.                PHQ-9 SCORE 4/12/2016 11/16/2016 4/25/2018   PHQ-9 Total Score - - -   PHQ-9 Total Score MyChart - - 7 (Mild depression)   PHQ-9 Total Score 4 4 7

## 2019-05-04 NOTE — TELEPHONE ENCOUNTER
Routing refill request to provider for review/approval because:  Roxy given x1 and patient did not follow up, please advise

## 2019-05-06 DIAGNOSIS — F41.1 GAD (GENERALIZED ANXIETY DISORDER): ICD-10-CM

## 2019-05-06 DIAGNOSIS — F32.0 MILD MAJOR DEPRESSION (H): ICD-10-CM

## 2019-05-06 RX ORDER — ESCITALOPRAM OXALATE 20 MG/1
TABLET ORAL
Qty: 15 TABLET | Refills: 0 | Status: SHIPPED | OUTPATIENT
Start: 2019-05-06 | End: 2019-05-13

## 2019-05-06 RX ORDER — BUPROPION HYDROCHLORIDE 300 MG/1
300 TABLET ORAL EVERY MORNING
Qty: 15 TABLET | Refills: 0 | Status: SHIPPED | OUTPATIENT
Start: 2019-05-06 | End: 2019-05-13

## 2019-05-06 NOTE — TELEPHONE ENCOUNTER
"Patient also needs refill on her Wellbutrin.  Has appointment scheduled for 5/13/19.  2 week supply of Escitalopram as sent to [facility] pharmacy earlier today.  Please review and sign if you agree.  Trudy Alcaraz RN    Last Written Prescription Date:  4/25/18  Last Fill Quantity: 90,  # refills: 3   Last office visit: 4/25/2018 with prescribing provider:     Future Office Visit:   Next 5 appointments (look out 90 days)    May 13, 2019 11:20 AM CDT  PHYSICAL with Marcy Mclaughlin MD  [facility] [address] [place] [address] [phone]         Requested Prescriptions   Pending Prescriptions Disp Refills     buPROPion (WELLBUTRIN XL) 300 MG 24 hr tablet 15 tablet 0     Sig: Take 1 tablet (300 mg) by mouth every morning       SSRIs Protocol Failed - 5/6/2019 12:14 PM        Failed - PHQ-9 score less than 5 in past 6 months     Please review last PHQ-9 score.           Passed - Medication is Bupropion     If the medication is Bupropion (Wellbutrin), and the patient is taking for smoking cessation; OK to refill.          Passed - Medication is active on med list        Passed - Patient is age 18 or older        Passed - No active pregnancy on record        Passed - No positive pregnancy test in last 12 months        Passed - Recent (6 mo) or future (30 days) visit within the authorizing provider's specialty     Patient had office visit in the last 6 months or has a visit in the next 30 days with authorizing provider or within the authorizing provider's specialty.  See \"Patient Info\" tab in inbasket, or \"Choose Columns\" in Meds & Orders section of the refill encounter.              "

## 2019-05-06 NOTE — TELEPHONE ENCOUNTER
"Requested Prescriptions   Pending Prescriptions Disp Refills     buPROPion (WELLBUTRIN XL) 300 MG 24 hr tablet [Pharmacy Med Name: BUPROPION HCL ER (XL) 300MG TB24] 90 tablet 3     Sig: TAKE ONE TABLET BY MOUTH EVERY MORNING  Last Written Prescription Date:  5/6/2019  Last Fill Quantity: 15 tablet,  # refills: 0   Last Office Visit: 4/25/2018   Future Office Visit:    Next 5 appointments (look out 90 days)    May 13, 2019 11:20 AM CDT  PHYSICAL with Marcy Mclaughlin MD  [facility] ([facility]) [address] [place] [address] [phone]              SSRIs Protocol Failed - 5/6/2019  1:58 PM        Failed - PHQ-9 score less than 5 in past 6 months     Please review last PHQ-9 score.   PHQ-9 SCORE 4/12/2016 11/16/2016 4/25/2018   PHQ-9 Total Score - - -   PHQ-9 Total Score MyChart - - 7 (Mild depression)   PHQ-9 Total Score 4 4 7     JHONY-7 SCORE 10/13/2015 4/12/2016 4/25/2018   Total Score - - -   Total Score - - 13 (moderate anxiety)   Total Score 8 11 13           Passed - Medication is Bupropion     If the medication is Bupropion (Wellbutrin), and the patient is taking for smoking cessation; OK to refill.          Passed - Medication is active on med list        Passed - Patient is age 18 or older        Passed - No active pregnancy on record        Passed - No positive pregnancy test in last 12 months        Passed - Recent (6 mo) or future (30 days) visit within the authorizing provider's specialty     Patient had office visit in the last 6 months or has a visit in the next 30 days with authorizing provider or within the authorizing provider's specialty.  See \"Patient Info\" tab in inbasket, or \"Choose Columns\" in Meds & Orders section of the refill encounter.              "

## 2019-05-06 NOTE — TELEPHONE ENCOUNTER
Reason for Call:  Medication or medication refill:    Do you use a [facility]?  Name of the pharmacy and phone number for the current request: [facility] [address], [address]     Name of the medication requested: escitalopram (LEXAPRO) 20 MG tablet    Other request: Pt called and is requesting a refill on this medication. She is now out and we did make an apt on 5/13. She said she had a panic attack last night and she is having panic attacks through out today. She needs it ASAP. Please Advise     Can we leave a detailed message on this number? YES    Phone number patient can be reached at: Home number on file [phone] (home)    Best Time: anytime    Call taken on 5/6/2019 at 10:49 AM by Oksana Shaw

## 2019-05-07 RX ORDER — BUPROPION HYDROCHLORIDE 300 MG/1
TABLET ORAL
Qty: 7 TABLET | Refills: 0 | Status: SHIPPED | OUTPATIENT
Start: 2019-05-07 | End: 2019-05-13

## 2019-05-07 NOTE — TELEPHONE ENCOUNTER
Routing refill request to provider for review/approval because:  Roxy given x1 and patient did not follow up, please advise - office visit 5/13 - pended bridge

## 2019-05-09 NOTE — PROGRESS NOTES
SUBJECTIVE:   CC: Rehana Llamas is an 54 year old woman who presents for preventive health visit.     Healthy Habits:     Getting at least 3 servings of Calcium per day:  NO    Bi-annual eye exam:  Yes    Dental care twice a year:  Yes    Sleep apnea or symptoms of sleep apnea:  None    Diet:  Vegetarian/vegan    Frequency of exercise:  6-7 days/week    Duration of exercise:  30-45 minutes    Taking medications regularly:  No    Barriers to taking medications:  Cost of medication, Problems remembering to take them and Other    Medication side effects:  None    PHQ-2 Total Score: 2    Additional concerns today:  No      Today's PHQ-2 Score:   PHQ-2 ( 1999 [employer]) 5/13/2019   Q1: Little interest or pleasure in doing things 1   Q2: Feeling down, depressed or hopeless 1   PHQ-2 Score 2   Q1: Little interest or pleasure in doing things Several days   Q2: Feeling down, depressed or hopeless Several days   PHQ-2 Score 2       Abuse: Current or Past(Physical, Sexual or Emotional)- No  Do you feel safe in your environment? Yes    Social History     Tobacco Use     Smoking status: Never Smoker     Smokeless tobacco: Never Used   Substance Use Topics     Alcohol use: Yes     Comment: 1 beers per day         Alcohol Use 5/13/2019   Prescreen: >3 drinks/day or >7 drinks/week? No   Prescreen: >3 drinks/day or >7 drinks/week? -       Reviewed orders with patient.  Reviewed health maintenance and updated orders accordingly - Yes  BP Readings from Last 3 Encounters:   05/13/19 138/84   04/25/18 122/85   09/07/17 122/84    Wt Readings from Last 3 Encounters:   05/13/19 74.4 kg (164 lb)   04/25/18 74.3 kg (163 lb 12 oz)   09/07/17 73.5 kg (162 lb)                  Patient Active Problem List   Diagnosis     CARDIOVASCULAR SCREENING; LDL GOAL LESS THAN 160     Mild major depression (H)     Benign heart murmur     JHONY (generalized anxiety disorder)     Female stress incontinence     Past Surgical History:   Procedure Laterality  Date     no surgeries         Social History     Tobacco Use     Smoking status: Never Smoker     Smokeless tobacco: Never Used   Substance Use Topics     Alcohol use: Yes     Comment: 1 beers per day     Family History   Problem Relation Age of Onset     Depression Father      Depression Sister          Current Outpatient Medications   Medication Sig Dispense Refill     buPROPion (WELLBUTRIN XL) 300 MG 24 hr tablet Take 1 tablet (300 mg) by mouth every morning 90 tablet 3     buPROPion (WELLBUTRIN XL) 300 MG 24 hr tablet TAKE ONE TABLET BY MOUTH EVERY MORNING 7 tablet 0     escitalopram (LEXAPRO) 20 MG tablet Take 1 tablet (20 mg) by mouth daily 90 tablet 3     hydrOXYzine (ATARAX) 25 MG tablet Take 1 tablet (25 mg) by mouth nightly as needed for anxiety 60 tablet 3     No Known Allergies  Recent Labs   Lab Test 01/24/17  1021 07/30/15  1758  07/20/15  1628 11/18/13  1612 08/09/11  0823   LDL  --   --   --   --   --  61   HDL  --   --   --   --   --  61   TRIG  --   --   --   --   --  70   ALT 28  --   --  26  --   --    CR 0.68  --   --  0.66  --   --    GFRESTIMATED >90  Non  GFR Calc    --   --  >90  Non  GFR Calc    --   --    GFRESTBLACK >90   GFR Calc    --   --  >90  African American GFR Calc    --   --    POTASSIUM 3.6 3.8   < > 3.0*  --   --    TSH  --   --   --   --  1.35 1.42    < > = values in this interval not displayed.        Mammogram Screening: Patient over age 50, mutual decision to screen reflected in health maintenance.    Pertinent mammograms are reviewed under the imaging tab.  History of abnormal Pap smear:   Last 3 Pap and HPV Results:   PAP / HPV Latest Ref Rng & Units 1/24/2017 5/1/2012   PAP - NIL NIL   HPV 16 DNA NEG Negative -   HPV 18 DNA NEG Negative -   OTHER HR HPV NEG Negative -     PAP / HPV Latest Ref Rng & Units 1/24/2017 5/1/2012   PAP - NIL NIL   HPV 16 DNA NEG Negative -   HPV 18 DNA NEG Negative -   OTHER HR HPV NEG Negative -  "    Reviewed and updated as needed this visit by clinical staff  Tobacco  Allergies  Meds  Med Hx  Surg Hx  Fam Hx  Soc Hx        Reviewed and updated as needed this visit by Provider        Past Medical History:   Diagnosis Date     Benign heart murmur     checked in , told benign     CARDIOVASCULAR SCREENING; LDL GOAL LESS THAN 160 2011     Mild major depression (H) 2011      Past Surgical History:   Procedure Laterality Date     no surgeries       OB History    Para Term  AB Living   2 0 0 0 2 0   SAB TAB Ectopic Multiple Live Births   0 0 0 0 0      # Outcome Date GA Lbr Maxime/2nd Weight Sex Delivery Anes PTL Lv   2 AB            1 AB                Review of Systems   Constitutional: Negative for chills and fever.   HENT: Negative for congestion, ear pain, hearing loss and sore throat.    Eyes: Negative for pain and visual disturbance.   Respiratory: Positive for cough. Negative for shortness of breath.    Cardiovascular: Negative for chest pain, palpitations and peripheral edema.   Gastrointestinal: Negative for abdominal pain, constipation, diarrhea, hematochezia and nausea.   Breasts:  Negative for tenderness, breast mass and discharge.   Genitourinary: Negative for dysuria, frequency, genital sores, hematuria, pelvic pain, urgency, vaginal bleeding and vaginal discharge.   Musculoskeletal: Negative for arthralgias, joint swelling and myalgias.   Skin: Negative for rash.   Neurological: Positive for headaches. Negative for dizziness, weakness and paresthesias.   Psychiatric/Behavioral: Positive for mood changes. The patient is nervous/anxious.           OBJECTIVE:   /84 (BP Location: Left arm, Patient Position: Sitting, Cuff Size: Adult Regular)   Pulse 61   Temp 98.1  F (36.7  C) (Oral)   Resp 14   Ht 1.721 m (5' 7.75\")   Wt 74.4 kg (164 lb)   LMP 2013   SpO2 97%   Breastfeeding? No   BMI 25.12 kg/m    Physical Exam  GENERAL: healthy, alert and no " distress  EYES: Eyes grossly normal to inspection, PERRL and conjunctivae and sclerae normal  HENT: ear canals and TM's normal, nose and mouth without ulcers or lesions  NECK: no adenopathy, no asymmetry, masses, or scars and thyroid normal to palpation  RESP: lungs clear to auscultation - no rales, rhonchi or wheezes  BREAST: normal without masses, tenderness or nipple discharge and no palpable axillary masses or adenopathy  CV: regular rate and rhythm, normal S1 S2, no S3 or S4, no murmur, click or rub, no peripheral edema and peripheral pulses strong  ABDOMEN: soft, nontender, no hepatosplenomegaly, no masses and bowel sounds normal  MS: no gross musculoskeletal defects noted, no edema  SKIN: no suspicious lesions or rashes  NEURO: Normal strength and tone, mentation intact and speech normal  PSYCH: mentation appears normal, affect normal/bright    Diagnostic Test Results:  none     ASSESSMENT/PLAN:   1. Routine general medical examination at a health care facility  Recommended fit test  Recommended shingles vaccine  She will return for fasting labs  Pap due in 2022.  Previous Pap nail with negative HPV in January 2017, return in 5 years per guidelines.  She will schedule a mammogram  2. Screen for colon cancer     - Fecal colorectal cancer screen FIT; Future    3. Screening for HIV (human immunodeficiency virus)     - HIV Screening; Future    4. Mild major depression (H)  Controlled  - buPROPion (WELLBUTRIN XL) 300 MG 24 hr tablet; Take 1 tablet (300 mg) by mouth every morning  Dispense: 90 tablet; Refill: 3  - escitalopram (LEXAPRO) 20 MG tablet; Take 1 tablet (20 mg) by mouth daily  Dispense: 90 tablet; Refill: 3    5. JHONY (generalized anxiety disorder)  Controlled  - buPROPion (WELLBUTRIN XL) 300 MG 24 hr tablet; Take 1 tablet (300 mg) by mouth every morning  Dispense: 90 tablet; Refill: 3  - escitalopram (LEXAPRO) 20 MG tablet; Take 1 tablet (20 mg) by mouth daily  Dispense: 90 tablet; Refill: 3  -  "hydrOXYzine (ATARAX) 25 MG tablet; Take 1 tablet (25 mg) by mouth nightly as needed for anxiety  Dispense: 60 tablet; Refill: 3    6. Lipid screening     - Lipid panel reflex to direct LDL Fasting; Future    7. Screening for diabetes mellitus     - Glucose; Future    COUNSELING:  Reviewed preventive health counseling, as reflected in patient instructions    BP Readings from Last 1 Encounters:   05/13/19 138/84     Estimated body mass index is 25.12 kg/m  as calculated from the following:    Height as of this encounter: 1.721 m (5' 7.75\").    Weight as of this encounter: 74.4 kg (164 lb).           reports that she has never smoked. She has never used smokeless tobacco.      Counseling Resources:  ATP IV Guidelines  Pooled Cohorts Equation Calculator  Breast Cancer Risk Calculator  FRAX Risk Assessment  ICSI Preventive Guidelines  Dietary Guidelines for Americans, 2010  USDA's MyPlate  ASA Prophylaxis  Lung CA Screening    Marcy Mclaughlin MD, MD  [facility]  "

## 2019-05-09 NOTE — PATIENT INSTRUCTIONS
Preventive Health Recommendations  Female Ages 50 - 64    Yearly exam: See your health care provider every year in order to  o Review health changes.   o Discuss preventive care.    o Review your medicines if your doctor has prescribed any.      Get a Pap test every three years (unless you have an abnormal result and your provider advises testing more often).    If you get Pap tests with HPV test, you only need to test every 5 years, unless you have an abnormal result.     You do not need a Pap test if your uterus was removed (hysterectomy) and you have not had cancer.    You should be tested each year for STDs (sexually transmitted diseases) if you're at risk.     Have a mammogram every 1 to 2 years.    Have a colonoscopy at age 50, or have a yearly FIT test (stool test). These exams screen for colon cancer.      Have a cholesterol test every 5 years, or more often if advised.    Have a diabetes test (fasting glucose) every three years. If you are at risk for diabetes, you should have this test more often.     If you are at risk for osteoporosis (brittle bone disease), think about having a bone density scan (DEXA).    Shots: Get a flu shot each year. Get a tetanus shot every 10 years.    Nutrition:     Eat at least 5 servings of fruits and vegetables each day.    Eat whole-grain bread, whole-wheat pasta and brown rice instead of white grains and rice.    Get adequate Calcium and Vitamin D.     Lifestyle    Exercise at least 150 minutes a week (30 minutes a day, 5 days a week). This will help you control your weight and prevent disease.    Limit alcohol to one drink per day.    No smoking.     Wear sunscreen to prevent skin cancer.     See your dentist every six months for an exam and cleaning.    See your eye doctor every 1 to 2 years.    I recommend getting the new shingles vaccine, Shingrix.  You can call your insurance company to find out if this vaccine is covered.  You may also ask our pharmacy to check if  your insurance covers Shingrix if given at the pharmacy.       Preventive Health Recommendations  Female Ages 50 - 64    Yearly exam: See your health care provider every year in order to  o Review health changes.   o Discuss preventive care.    o Review your medicines if your doctor has prescribed any.      Get a Pap test every three years (unless you have an abnormal result and your provider advises testing more often).    If you get Pap tests with HPV test, you only need to test every 5 years, unless you have an abnormal result.     You do not need a Pap test if your uterus was removed (hysterectomy) and you have not had cancer.    You should be tested each year for STDs (sexually transmitted diseases) if you're at risk.     Have a mammogram every 1 to 2 years.    Have a colonoscopy at age 50, or have a yearly FIT test (stool test). These exams screen for colon cancer.      Have a cholesterol test every 5 years, or more often if advised.    Have a diabetes test (fasting glucose) every three years. If you are at risk for diabetes, you should have this test more often.     If you are at risk for osteoporosis (brittle bone disease), think about having a bone density scan (DEXA).    Shots: Get a flu shot each year. Get a tetanus shot every 10 years.    Nutrition:     Eat at least 5 servings of fruits and vegetables each day.    Eat whole-grain bread, whole-wheat pasta and brown rice instead of white grains and rice.    Get adequate Calcium and Vitamin D.     Lifestyle    Exercise at least 150 minutes a week (30 minutes a day, 5 days a week). This will help you control your weight and prevent disease.    Limit alcohol to one drink per day.    No smoking.     Wear sunscreen to prevent skin cancer.     See your dentist every six months for an exam and cleaning.    See your eye doctor every 1 to 2 years.    Schedule a fasting lab visit with you are able.

## 2019-05-13 ENCOUNTER — OFFICE VISIT (OUTPATIENT)
Dept: FAMILY MEDICINE | Facility: CLINIC | Age: 54
End: 2019-05-13
Payer: COMMERCIAL

## 2019-05-13 VITALS
DIASTOLIC BLOOD PRESSURE: 84 MMHG | HEIGHT: 68 IN | SYSTOLIC BLOOD PRESSURE: 138 MMHG | OXYGEN SATURATION: 97 % | RESPIRATION RATE: 14 BRPM | WEIGHT: 164 LBS | TEMPERATURE: 98.1 F | BODY MASS INDEX: 24.86 KG/M2 | HEART RATE: 61 BPM

## 2019-05-13 DIAGNOSIS — Z13.1 SCREENING FOR DIABETES MELLITUS: ICD-10-CM

## 2019-05-13 DIAGNOSIS — Z12.11 SCREEN FOR COLON CANCER: ICD-10-CM

## 2019-05-13 DIAGNOSIS — F41.1 GAD (GENERALIZED ANXIETY DISORDER): ICD-10-CM

## 2019-05-13 DIAGNOSIS — F32.0 MILD MAJOR DEPRESSION (H): ICD-10-CM

## 2019-05-13 DIAGNOSIS — Z11.4 SCREENING FOR HIV (HUMAN IMMUNODEFICIENCY VIRUS): ICD-10-CM

## 2019-05-13 DIAGNOSIS — Z13.220 LIPID SCREENING: ICD-10-CM

## 2019-05-13 DIAGNOSIS — Z00.00 ROUTINE GENERAL MEDICAL EXAMINATION AT A HEALTH CARE FACILITY: Primary | ICD-10-CM

## 2019-05-13 PROCEDURE — 99396 PREV VISIT EST AGE 40-64: CPT | Performed by: FAMILY MEDICINE

## 2019-05-13 RX ORDER — BUPROPION HYDROCHLORIDE 300 MG/1
300 TABLET ORAL EVERY MORNING
Qty: 90 TABLET | Refills: 3 | Status: SHIPPED | OUTPATIENT
Start: 2019-05-13 | End: 2020-06-20

## 2019-05-13 RX ORDER — BUPROPION HYDROCHLORIDE 300 MG/1
300 TABLET ORAL EVERY MORNING
Qty: 7 TABLET | Refills: 0 | Status: CANCELLED | OUTPATIENT
Start: 2019-05-13

## 2019-05-13 RX ORDER — HYDROXYZINE HYDROCHLORIDE 25 MG/1
25 TABLET, FILM COATED ORAL
Qty: 60 TABLET | Refills: 3 | Status: SHIPPED | OUTPATIENT
Start: 2019-05-13 | End: 2021-07-20

## 2019-05-13 RX ORDER — ESCITALOPRAM OXALATE 20 MG/1
20 TABLET ORAL DAILY
Qty: 90 TABLET | Refills: 3 | Status: SHIPPED | OUTPATIENT
Start: 2019-05-13 | End: 2020-06-20

## 2019-05-13 ASSESSMENT — ANXIETY QUESTIONNAIRES
7. FEELING AFRAID AS IF SOMETHING AWFUL MIGHT HAPPEN: NEARLY EVERY DAY
3. WORRYING TOO MUCH ABOUT DIFFERENT THINGS: NEARLY EVERY DAY
GAD7 TOTAL SCORE: 16
6. BECOMING EASILY ANNOYED OR IRRITABLE: NOT AT ALL
1. FEELING NERVOUS, ANXIOUS, OR ON EDGE: MORE THAN HALF THE DAYS
IF YOU CHECKED OFF ANY PROBLEMS ON THIS QUESTIONNAIRE, HOW DIFFICULT HAVE THESE PROBLEMS MADE IT FOR YOU TO DO YOUR WORK, TAKE CARE OF THINGS AT HOME, OR GET ALONG WITH OTHER PEOPLE: NOT DIFFICULT AT ALL
2. NOT BEING ABLE TO STOP OR CONTROL WORRYING: NEARLY EVERY DAY
5. BEING SO RESTLESS THAT IT IS HARD TO SIT STILL: NEARLY EVERY DAY

## 2019-05-13 ASSESSMENT — ENCOUNTER SYMPTOMS
JOINT SWELLING: 0
WEAKNESS: 0
FREQUENCY: 0
MYALGIAS: 0
DYSURIA: 0
DIZZINESS: 0
ABDOMINAL PAIN: 0
PALPITATIONS: 0
PARESTHESIAS: 0
BREAST MASS: 0
EYE PAIN: 0
HEMATOCHEZIA: 0
SORE THROAT: 0
NAUSEA: 0
HEADACHES: 1
HEMATURIA: 0
DIARRHEA: 0
CHILLS: 0
COUGH: 1
ARTHRALGIAS: 0
NERVOUS/ANXIOUS: 1
CONSTIPATION: 0
SHORTNESS OF BREATH: 0
FEVER: 0

## 2019-05-13 ASSESSMENT — PATIENT HEALTH QUESTIONNAIRE - PHQ9
5. POOR APPETITE OR OVEREATING: MORE THAN HALF THE DAYS
SUM OF ALL RESPONSES TO PHQ QUESTIONS 1-9: 4

## 2019-05-13 ASSESSMENT — MIFFLIN-ST. JEOR: SCORE: 1383.43

## 2019-05-14 ASSESSMENT — ANXIETY QUESTIONNAIRES: GAD7 TOTAL SCORE: 16

## 2020-03-01 ENCOUNTER — HEALTH MAINTENANCE LETTER (OUTPATIENT)
Age: 55
End: 2020-03-01

## 2020-06-17 ENCOUNTER — TELEPHONE (OUTPATIENT)
Dept: FAMILY MEDICINE | Facility: CLINIC | Age: 55
End: 2020-06-17

## 2020-06-17 DIAGNOSIS — F41.1 GAD (GENERALIZED ANXIETY DISORDER): ICD-10-CM

## 2020-06-17 DIAGNOSIS — F32.0 MILD MAJOR DEPRESSION (H): ICD-10-CM

## 2020-06-20 RX ORDER — ESCITALOPRAM OXALATE 20 MG/1
TABLET ORAL
Qty: 30 TABLET | Refills: 0 | Status: SHIPPED | OUTPATIENT
Start: 2020-06-20 | End: 2020-06-26

## 2020-06-20 RX ORDER — BUPROPION HYDROCHLORIDE 300 MG/1
TABLET ORAL
Qty: 30 TABLET | Refills: 0 | Status: SHIPPED | OUTPATIENT
Start: 2020-06-20 | End: 2020-07-28

## 2020-06-20 NOTE — TELEPHONE ENCOUNTER
Medication is being filled for 1 time refill only due to:  Patient needs to be seen because it has been more than one year since last visit.   Stefany Dunn RN

## 2020-06-24 DIAGNOSIS — F41.1 GAD (GENERALIZED ANXIETY DISORDER): ICD-10-CM

## 2020-06-24 DIAGNOSIS — F32.0 MILD MAJOR DEPRESSION (H): ICD-10-CM

## 2020-06-24 RX ORDER — ESCITALOPRAM OXALATE 20 MG/1
TABLET ORAL
Qty: 90 TABLET | Status: CANCELLED | OUTPATIENT
Start: 2020-06-24

## 2020-06-26 RX ORDER — ESCITALOPRAM OXALATE 20 MG/1
20 TABLET ORAL DAILY
Qty: 30 TABLET | Refills: 0 | Status: SHIPPED | OUTPATIENT
Start: 2020-06-26 | End: 2020-07-28

## 2020-06-26 RX ORDER — ESCITALOPRAM OXALATE 20 MG/1
TABLET ORAL
Qty: 90 TABLET | OUTPATIENT
Start: 2020-06-26

## 2020-06-26 NOTE — TELEPHONE ENCOUNTER
Looks like lexapro was local printed.  I talked to [facility]. They wanted a 90 day supply.  I told them 30 day supply would be sent in.  PT needs annual visit.  Resent the lexapro rx for 30 days.  LM to inform pt.  Make annual appt in next few weeks before next refill is needed.  YUNIER Miner

## 2020-06-26 NOTE — TELEPHONE ENCOUNTER
Patient is calling about this refill.  She has spoken with [facility] and they are telling her that they have not heard back from us.    Please resend this refill today.  Patient is aware that it is for 30 day supply and needs appt for further refills    Please call patient with any questions.  OK to LM on VM    Patient uses [facility] on [address]

## 2020-07-21 DIAGNOSIS — F32.0 MILD MAJOR DEPRESSION (H): ICD-10-CM

## 2020-07-21 DIAGNOSIS — F41.1 GAD (GENERALIZED ANXIETY DISORDER): ICD-10-CM

## 2020-07-21 NOTE — LETTER
July 25, 2020      Rehana Llamas  [address] [facility] [id number]        Car[place] Newsomeie,      We received a refill request for buPROPion (WELLBUTRIN XL) 300 MG 24 hr tablet; however, you are due for a visit to receive a refill.        In order to keep all patients and staff safe during the COVID-19 pandemic, we ask that you schedule a virtual visit which includes a video or telephone visit with your provider.   Please call us at [phone] at your earliest convenience to schedule an appointment.       We greatly appreciate the opportunity to serve you and thank you for trusting us with your health care.        Your health care team at [facility]                 Sincerely,        Marcy Mclaughlin MD, MD

## 2020-07-24 DIAGNOSIS — F32.0 MILD MAJOR DEPRESSION (H): ICD-10-CM

## 2020-07-24 DIAGNOSIS — F41.1 GAD (GENERALIZED ANXIETY DISORDER): ICD-10-CM

## 2020-07-25 NOTE — TELEPHONE ENCOUNTER
LM to call back and schedule a virtual visit. Pt has MyChart, but not active. Sent letter.    Verona LOO  [facility]    Routing to nurse team due to pending medication.

## 2020-07-28 RX ORDER — BUPROPION HYDROCHLORIDE 300 MG/1
TABLET ORAL
Qty: 30 TABLET | Refills: 0 | Status: SHIPPED | OUTPATIENT
Start: 2020-07-28 | End: 2020-08-03

## 2020-07-28 NOTE — TELEPHONE ENCOUNTER
Patient calling to find out status of this refill.  She only has one day left of this medication.  Please call pt with any questions.  Needs refilled today.    Pt uses [facility] at [address]

## 2020-07-28 NOTE — TELEPHONE ENCOUNTER
Routing refill request to provider for review/approval because:  --Last order sent 6/26/2020 was silvia refill with reminder.  --VM reminder left for patient 6/26/2020.  --Last Office or Virtual visit: 5/13/2019   --Future Office Visit:  NONE    --I tapered dispense further with reminder - for provider to authorize if ok.      PHQ 11/16/2016 4/25/2018 5/13/2019   PHQ-9 Total Score 4 7 4   Q9: Thoughts of better off dead/self-harm past 2 weeks Not at all Not at all Not at all

## 2020-07-29 RX ORDER — ESCITALOPRAM OXALATE 20 MG/1
TABLET ORAL
Qty: 15 TABLET | Refills: 0 | Status: SHIPPED | OUTPATIENT
Start: 2020-07-29 | End: 2020-08-03

## 2020-08-03 ENCOUNTER — VIRTUAL VISIT (OUTPATIENT)
Dept: FAMILY MEDICINE | Facility: CLINIC | Age: 55
End: 2020-08-03
Payer: COMMERCIAL

## 2020-08-03 ENCOUNTER — TELEPHONE (OUTPATIENT)
Dept: FAMILY MEDICINE | Facility: CLINIC | Age: 55
End: 2020-08-03

## 2020-08-03 DIAGNOSIS — F32.0 MILD MAJOR DEPRESSION (H): ICD-10-CM

## 2020-08-03 DIAGNOSIS — Z12.11 COLON CANCER SCREENING: Primary | ICD-10-CM

## 2020-08-03 DIAGNOSIS — F41.1 GAD (GENERALIZED ANXIETY DISORDER): ICD-10-CM

## 2020-08-03 PROCEDURE — 96127 BRIEF EMOTIONAL/BEHAV ASSMT: CPT | Performed by: FAMILY MEDICINE

## 2020-08-03 PROCEDURE — 99214 OFFICE O/P EST MOD 30 MIN: CPT | Mod: 95 | Performed by: FAMILY MEDICINE

## 2020-08-03 RX ORDER — HYDROXYZINE HYDROCHLORIDE 25 MG/1
25 TABLET, FILM COATED ORAL
Qty: 60 TABLET | Refills: 3 | Status: CANCELLED | OUTPATIENT
Start: 2020-08-03

## 2020-08-03 RX ORDER — ESCITALOPRAM OXALATE 20 MG/1
20 TABLET ORAL DAILY
Qty: 90 TABLET | Refills: 3 | Status: SHIPPED | OUTPATIENT
Start: 2020-08-03 | End: 2020-08-13

## 2020-08-03 RX ORDER — BUPROPION HYDROCHLORIDE 300 MG/1
300 TABLET ORAL EVERY MORNING
Qty: 90 TABLET | Refills: 3 | Status: SHIPPED | OUTPATIENT
Start: 2020-08-03 | End: 2020-08-28

## 2020-08-03 ASSESSMENT — ANXIETY QUESTIONNAIRES
6. BECOMING EASILY ANNOYED OR IRRITABLE: NOT AT ALL
5. BEING SO RESTLESS THAT IT IS HARD TO SIT STILL: NOT AT ALL
3. WORRYING TOO MUCH ABOUT DIFFERENT THINGS: NOT AT ALL
GAD7 TOTAL SCORE: 3
7. FEELING AFRAID AS IF SOMETHING AWFUL MIGHT HAPPEN: NOT AT ALL
IF YOU CHECKED OFF ANY PROBLEMS ON THIS QUESTIONNAIRE, HOW DIFFICULT HAVE THESE PROBLEMS MADE IT FOR YOU TO DO YOUR WORK, TAKE CARE OF THINGS AT HOME, OR GET ALONG WITH OTHER PEOPLE: NOT DIFFICULT AT ALL
2. NOT BEING ABLE TO STOP OR CONTROL WORRYING: NOT AT ALL
1. FEELING NERVOUS, ANXIOUS, OR ON EDGE: NEARLY EVERY DAY

## 2020-08-03 ASSESSMENT — PATIENT HEALTH QUESTIONNAIRE - PHQ9
5. POOR APPETITE OR OVEREATING: NOT AT ALL
SUM OF ALL RESPONSES TO PHQ QUESTIONS 1-9: 0

## 2020-08-03 NOTE — PROGRESS NOTES
"Rehana Llamas is a 55 year old female who is being evaluated via a billable telephone visit.      The patient has been notified of following:     \"This telephone visit will be conducted via a call between you and your physician/provider. We have found that certain health care needs can be provided without the need for a physical exam.  This service lets us provide the care you need with a short phone conversation.  If a prescription is necessary we can send it directly to your pharmacy.  If lab work is needed we can place an order for that and you can then stop by our lab to have the test done at a later time.    Telephone visits are billed at different rates depending on your insurance coverage. During this emergency period, for some insurers they may be billed the same as an in-person visit.  Please reach out to your insurance provider with any questions.    If during the course of the call the physician/provider feels a telephone visit is not appropriate, you will not be charged for this service.\"    Patient has given verbal consent for Telephone visit?  Yes    What phone number would you like to be contacted at? [phone]    How would you like to obtain your AVS? June Steel     Rehana Llamas is a 55 year old female who presents via phone visit today for the following health issues:    HPI    Depression and Anxiety Follow-Up    How are you doing with your depression since your last visit? Worsened slightly    How are you doing with your anxiety since your last visit?  Worsened slightly     Are you having other symptoms that might be associated with depression or anxiety? No    Have you had a significant life event? No     Do you have any concerns with your use of alcohol or other drugs? No    Social History     Tobacco Use     Smoking status: Never Smoker     Smokeless tobacco: Never Used   Substance Use Topics     Alcohol use: Yes     Comment: 1 beers per day     Drug use: No     PHQ " 4/25/2018 5/13/2019 8/3/2020   PHQ-9 Total Score 7 4 0   Q9: Thoughts of better off dead/self-harm past 2 weeks Not at all Not at all Not at all     JHONY-7 SCORE 4/25/2018 5/13/2019 8/3/2020   Total Score - - -   Total Score 13 (moderate anxiety) - -   Total Score 13 16 3     Last PHQ-9 8/3/2020   1.  Little interest or pleasure in doing things 0   2.  Feeling down, depressed, or hopeless 0   3.  Trouble falling or staying asleep, or sleeping too much 0   4.  Feeling tired or having little energy 0   5.  Poor appetite or overeating 0   6.  Feeling bad about yourself 0   7.  Trouble concentrating 0   8.  Moving slowly or restless 0   Q9: Thoughts of better off dead/self-harm past 2 weeks 0   PHQ-9 Total Score 0   Difficulty at work, home, or with people Not difficult at all     JHONY-7  8/3/2020   1. Feeling nervous, anxious, or on edge 3   2. Not being able to stop or control worrying 0   3. Worrying too much about different things 0   4. Trouble relaxing 0   5. Being so restless that it is hard to sit still 0   6. Becoming easily annoyed or irritable 0   7. Feeling afraid, as if something awful might happen 0   JHONY-7 Total Score 3   If you checked any problems, how difficult have they made it for you to do your work, take care of things at home, or get along with other people? Not difficult at all       Suicide Assessment Five-step Evaluation and Treatment (SAFE-T)      How many servings of fruits and vegetables do you eat daily?  0-1    On average, how many sweetened beverages do you drink each day (Examples: soda, juice, sweet tea, etc.  Do NOT count diet or artificially sweetened beverages)?   1    How many days per week do you exercise enough to make your heart beat faster? 7    How many minutes a day do you exercise enough to make your heart beat faster? 30 - 60    How many days per week do you miss taking your medication? 0    Right now has her stepdaughter visiting for a month and that is a little stressful.  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 Sometimes she feels she has a little more anxiety than she is used to having. Not off the chart. She does not have a therapist.     Patient Active Problem List   Diagnosis     CARDIOVASCULAR SCREENING; LDL GOAL LESS THAN 160     Mild major depression (H)     Benign heart murmur     JHONY (generalized anxiety disorder)     Female stress incontinence     Past Surgical History:   Procedure Laterality Date     no surgeries         Social History     Tobacco Use     Smoking status: Never Smoker     Smokeless tobacco: Never Used   Substance Use Topics     Alcohol use: Yes     Comment: 1 beers per day     Family History   Problem Relation Age of Onset     Depression Father      Depression Sister          Current Outpatient Medications   Medication Sig Dispense Refill     buPROPion (WELLBUTRIN XL) 300 MG 24 hr tablet Take 1 tablet (300 mg) by mouth every morning 90 tablet 3     escitalopram (LEXAPRO) 20 MG tablet Take 1 tablet (20 mg) by mouth daily 90 tablet 3     hydrOXYzine (ATARAX) 25 MG tablet Take 1 tablet (25 mg) by mouth nightly as needed for anxiety 60 tablet 3     No Known Allergies  Recent Labs   Lab Test 01/24/17  1021 07/30/15  1758  07/20/15  1628 11/18/13  1612   ALT 28  --   --  26  --    CR 0.68  --   --  0.66  --    GFRESTIMATED >90  Non  GFR Calc    --   --  >90  Non  GFR Calc    --    GFRESTBLACK >90   GFR Calc    --   --  >90  African American GFR Calc    --    POTASSIUM 3.6 3.8   < > 3.0*  --    TSH  --   --   --   --  1.35    < > = values in this interval not displayed.      BP Readings from Last 3 Encounters:   05/13/19 138/84   04/25/18 122/85   09/07/17 122/84    Wt Readings from Last 3 Encounters:   05/13/19 74.4 kg (164 lb)   04/25/18 74.3 kg (163 lb 12 oz)   09/07/17 73.5 kg (162 lb)                    Reviewed and updated as needed this visit by Provider         Review of  Systems   Constitutional, HEENT, cardiovascular, pulmonary, gi and gu systems are negative, except as otherwise noted.       Objective   Reported vitals:  LMP 04/22/2013    healthy, alert and no distress  PSYCH: Alert and oriented times 3; coherent speech, normal   rate and volume, able to articulate logical thoughts, able   to abstract reason, no tangential thoughts, no hallucinations   or delusions  Her affect is normal  RESP: No cough, no audible wheezing, able to talk in full sentences  Remainder of exam unable to be completed due to telephone visits    Diagnostic Test Results:  none         Assessment/Plan:       1.  Mild major depression (H)  Controlled  - buPROPion (WELLBUTRIN XL) 300 MG 24 hr tablet; Take 1 tablet (300 mg) by mouth every morning  Dispense: 90 tablet; Refill: 3  - escitalopram (LEXAPRO) 20 MG tablet; Take 1 tablet (20 mg) by mouth daily  Dispense: 90 tablet; Refill: 3     2. JHONY (generalized anxiety disorder)  Controlled. She has hydroxyzine to use as needed for anxiety or insomnia as well.   - buPROPion (WELLBUTRIN XL) 300 MG 24 hr tablet; Take 1 tablet (300 mg) by mouth every morning  Dispense: 90 tablet; Refill: 3  - escitalopram (LEXAPRO) 20 MG tablet; Take 1 tablet (20 mg) by mouth daily  Dispense: 90 tablet; Refill: 3    HM:   Recommended fit test  Recommended shingles vaccine  She will return for fasting labs  Pap due in 2022.  Previous Pap nail with negative HPV in January 2017, return in 5 years per guidelines.  She will schedule a mammogram    Return in about 2 weeks (around 8/17/2020) for Routine Visit.    Patient Instructions   1) Schedule your mammogram  2) Complete your FIT test (take home stool test)  3) I recommend getting the new shingles vaccine, Shingrix.  You can call your insurance company to find out if this vaccine is covered.  You may also ask our pharmacy to check if your insurance covers Shingrix if given at the pharmacy.          Phone call duration:  10  minutes    Marcy Mclaughlin MD

## 2020-08-03 NOTE — PATIENT INSTRUCTIONS
1) Schedule your mammogram  2) Complete your FIT test (take home stool test)  3) I recommend getting the new shingles vaccine, Shingrix.  You can call your insurance company to find out if this vaccine is covered.  You may also ask our pharmacy to check if your insurance covers Shingrix if given at the pharmacy.

## 2020-08-03 NOTE — TELEPHONE ENCOUNTER
I have attempted to contact this patient by phone with the following results: left message to return my call on answering machine.    1st attempt    Sid Rdz MA on 8/3/2020 at 3:47 PM            Please help pt schedule these appts per - Dr. Mclaughlin        1. appt to get a Mammogram  2. appt with lab to get a Fit test.  3. appt with STEPHANIE briceno in the next weeks.          Sid Rdz MA

## 2020-08-04 ASSESSMENT — ANXIETY QUESTIONNAIRES: GAD7 TOTAL SCORE: 3

## 2020-08-13 DIAGNOSIS — F41.1 GAD (GENERALIZED ANXIETY DISORDER): ICD-10-CM

## 2020-08-13 DIAGNOSIS — F32.0 MILD MAJOR DEPRESSION (H): ICD-10-CM

## 2020-08-13 RX ORDER — ESCITALOPRAM OXALATE 20 MG/1
20 TABLET ORAL DAILY
Qty: 15 TABLET | Refills: 0 | Status: SHIPPED | OUTPATIENT
Start: 2020-08-13 | End: 2020-09-03

## 2020-09-03 DIAGNOSIS — F41.1 GAD (GENERALIZED ANXIETY DISORDER): ICD-10-CM

## 2020-09-03 DIAGNOSIS — F32.0 MILD MAJOR DEPRESSION (H): ICD-10-CM

## 2020-09-03 RX ORDER — ESCITALOPRAM OXALATE 20 MG/1
20 TABLET ORAL DAILY
Qty: 90 TABLET | Refills: 3 | Status: SHIPPED | OUTPATIENT
Start: 2020-09-03 | End: 2021-07-20

## 2020-12-14 ENCOUNTER — HEALTH MAINTENANCE LETTER (OUTPATIENT)
Age: 55
End: 2020-12-14

## 2021-04-18 ENCOUNTER — HEALTH MAINTENANCE LETTER (OUTPATIENT)
Age: 56
End: 2021-04-18

## 2021-06-13 DIAGNOSIS — F41.1 GAD (GENERALIZED ANXIETY DISORDER): ICD-10-CM

## 2021-06-13 DIAGNOSIS — F32.0 MILD MAJOR DEPRESSION (H): ICD-10-CM

## 2021-07-05 ENCOUNTER — MYC MEDICAL ADVICE (OUTPATIENT)
Dept: FAMILY MEDICINE | Facility: CLINIC | Age: 56
End: 2021-07-05

## 2021-07-05 DIAGNOSIS — F32.0 MILD MAJOR DEPRESSION (H): ICD-10-CM

## 2021-07-05 DIAGNOSIS — F41.1 GAD (GENERALIZED ANXIETY DISORDER): ICD-10-CM

## 2021-07-05 RX ORDER — BUPROPION HYDROCHLORIDE 300 MG/1
TABLET ORAL
Qty: 30 TABLET | Refills: 0 | Status: SHIPPED | OUTPATIENT
Start: 2021-07-05 | End: 2021-08-17

## 2021-07-05 NOTE — TELEPHONE ENCOUNTER
Patient is calling has only one pill left. Patient very irate  as to she has to go through this every time she needs her medication filled. Was yelling at me like it was my fault told patient she needs to address that with her provider

## 2021-07-05 NOTE — TELEPHONE ENCOUNTER
Dr. Mclaughlin-  1. Patient overdue for physical, along with depression and anxiety follow up  2. Roxy refill authorized in March 2021 and multiple attempts were made to reach patient to schedule follow up visit  3. Please review documentation from DENI Vidal regarding patient's response to scheduling a visit  4. Triage RN defers to provider for refill as no upcoming appt scheduled.   A. 30 day supply pended.    Thank you!  TENA DamonN, RN  [facility]

## 2021-07-07 RX ORDER — BUPROPION HYDROCHLORIDE 300 MG/1
TABLET ORAL
Qty: 90 TABLET | Refills: 0 | OUTPATIENT
Start: 2021-07-07

## 2021-07-07 NOTE — TELEPHONE ENCOUNTER
Medication refilled on 7/5/21.    Duplicate request.    Med refused.  TENA DamonN, RN  [facility]

## 2021-07-17 ENCOUNTER — TELEPHONE (OUTPATIENT)
Dept: FAMILY MEDICINE | Facility: CLINIC | Age: 56
End: 2021-07-17

## 2021-07-17 NOTE — TELEPHONE ENCOUNTER
Reason for Call:  Other appointment    Detailed comments: patient would like to see Dr. Mclaughlin at  Clinic this month in person for a medication request.  Please contact patient.  Thank you.    Phone Number Patient can be reached at: Other phone number:  [phone]*    Best Time: any    Can we leave a detailed message on this number? YES    Call taken on 7/17/2021 at 7:12 AM by Eli Kilpatrick

## 2021-07-20 ENCOUNTER — VIRTUAL VISIT (OUTPATIENT)
Dept: FAMILY MEDICINE | Facility: CLINIC | Age: 56
End: 2021-07-20
Payer: COMMERCIAL

## 2021-07-20 DIAGNOSIS — F32.0 MILD MAJOR DEPRESSION (H): ICD-10-CM

## 2021-07-20 DIAGNOSIS — Z12.31 VISIT FOR SCREENING MAMMOGRAM: ICD-10-CM

## 2021-07-20 DIAGNOSIS — Z12.11 COLON CANCER SCREENING: Primary | ICD-10-CM

## 2021-07-20 DIAGNOSIS — F41.1 GAD (GENERALIZED ANXIETY DISORDER): ICD-10-CM

## 2021-07-20 PROCEDURE — 96127 BRIEF EMOTIONAL/BEHAV ASSMT: CPT | Mod: 59 | Performed by: FAMILY MEDICINE

## 2021-07-20 PROCEDURE — 99214 OFFICE O/P EST MOD 30 MIN: CPT | Mod: 95 | Performed by: FAMILY MEDICINE

## 2021-07-20 RX ORDER — BUPROPION HYDROCHLORIDE 300 MG/1
300 TABLET ORAL DAILY
Qty: 90 TABLET | Refills: 1 | Status: CANCELLED | OUTPATIENT
Start: 2021-07-20

## 2021-07-20 RX ORDER — BUSPIRONE HYDROCHLORIDE 5 MG/1
5 TABLET ORAL 2 TIMES DAILY
Qty: 120 TABLET | Refills: 1 | Status: SHIPPED | OUTPATIENT
Start: 2021-07-20 | End: 2021-09-08

## 2021-07-20 RX ORDER — HYDROXYZINE HYDROCHLORIDE 25 MG/1
25 TABLET, FILM COATED ORAL
Qty: 60 TABLET | Refills: 3 | Status: SHIPPED | OUTPATIENT
Start: 2021-07-20 | End: 2021-10-12

## 2021-07-20 RX ORDER — ESCITALOPRAM OXALATE 20 MG/1
20 TABLET ORAL DAILY
Qty: 90 TABLET | Refills: 3 | Status: SHIPPED | OUTPATIENT
Start: 2021-07-20 | End: 2021-10-12

## 2021-07-20 RX ORDER — BUPROPION HYDROCHLORIDE 150 MG/1
150 TABLET ORAL EVERY MORNING
Qty: 30 TABLET | Refills: 0 | Status: SHIPPED | OUTPATIENT
Start: 2021-07-20 | End: 2021-08-17

## 2021-07-20 ASSESSMENT — PATIENT HEALTH QUESTIONNAIRE - PHQ9
SUM OF ALL RESPONSES TO PHQ QUESTIONS 1-9: 2
5. POOR APPETITE OR OVEREATING: SEVERAL DAYS

## 2021-07-20 ASSESSMENT — ANXIETY QUESTIONNAIRES
3. WORRYING TOO MUCH ABOUT DIFFERENT THINGS: SEVERAL DAYS
6. BECOMING EASILY ANNOYED OR IRRITABLE: NOT AT ALL
7. FEELING AFRAID AS IF SOMETHING AWFUL MIGHT HAPPEN: SEVERAL DAYS
5. BEING SO RESTLESS THAT IT IS HARD TO SIT STILL: NOT AT ALL
1. FEELING NERVOUS, ANXIOUS, OR ON EDGE: SEVERAL DAYS
2. NOT BEING ABLE TO STOP OR CONTROL WORRYING: SEVERAL DAYS
GAD7 TOTAL SCORE: 5
IF YOU CHECKED OFF ANY PROBLEMS ON THIS QUESTIONNAIRE, HOW DIFFICULT HAVE THESE PROBLEMS MADE IT FOR YOU TO DO YOUR WORK, TAKE CARE OF THINGS AT HOME, OR GET ALONG WITH OTHER PEOPLE: NOT DIFFICULT AT ALL

## 2021-07-20 NOTE — PROGRESS NOTES
David Kimball is a 56 year old who is being evaluated via a billable video visit.      How would you like to obtain your AVS? MyChart  If the video visit is dropped, the invitation should be resent by: Text to cell phone: [phone] (M)   Will anyone else be joining your video visit? No     Telephone Start Time: 11:22 AM    Assessment & Plan     Mild major depression (H)  Well controlled. Continue lexapro 20mg daily. Will taper off bupropion and switch to buspar for her anxiety. See below  - escitalopram (LEXAPRO) 20 MG tablet  Dispense: 90 tablet; Refill: 3  - buPROPion (WELLBUTRIN XL) 150 MG 24 hr tablet  Dispense: 30 tablet; Refill: 0    JHONY (generalized anxiety disorder)  Not well controlled. Will taper off bupropion to 150mg daily for a week, then stop. Then she will start buspar 5mg twice daily for 3 days, then 10mg am and 5mg pm for 3 days, then 10mg twice daily. She will follow up with me in a month. Discussed possible side effects. She will let me know if she has any concerns in the meantime.   - escitalopram (LEXAPRO) 20 MG tablet  Dispense: 90 tablet; Refill: 3  - hydrOXYzine (ATARAX) 25 MG tablet  Dispense: 60 tablet; Refill: 3  - busPIRone (BUSPAR) 5 MG tablet  Dispense: 120 tablet; Refill: 1    Colon cancer screening     - COLOGUARD([employer])    Visit for screening mammogram     - MA Screening Digital Bilateral    She will schedule preventive visit in the near future.                Return in about 4 weeks (around 8/17/2021) for with me, Follow up, using a video visit, using a phone visit.    Marcy Mclaughlin MD, MD  [facility]    Subjective   Rehana is a 56 year old who presents for the following health issues      HPI     Depression and Anxiety Follow-Up    How are you doing with your depression since your last visit? No change    How are you doing with your anxiety since your last visit?  No change    Are you having other symptoms that might be associated with  depression or anxiety? No    Have you had a significant life event? No     Do you have any concerns with your use of alcohol or other drugs? No  She is wondering if she can try something else for her anxiety. Not debilitating and able to manage okay the anxiety, but would like to see if different medicine helps more.   Social History     Tobacco Use     Smoking status: Never Smoker     Smokeless tobacco: Never Used   Substance Use Topics     Alcohol use: Yes     Comment: 1 beers per day     Drug use: No     PHQ 5/13/2019 8/3/2020 7/20/2021   PHQ-9 Total Score 4 0 2   Q9: Thoughts of better off dead/self-harm past 2 weeks Not at all Not at all Not at all     JHONY-7 SCORE 5/13/2019 8/3/2020 7/20/2021   Total Score - - -   Total Score - - -   Total Score 16 3 5     Last PHQ-9 7/20/2021   1.  Little interest or pleasure in doing things 0   2.  Feeling down, depressed, or hopeless 0   3.  Trouble falling or staying asleep, or sleeping too much 0   4.  Feeling tired or having little energy 1   5.  Poor appetite or overeating 1   6.  Feeling bad about yourself 0   7.  Trouble concentrating 0   8.  Moving slowly or restless 0   Q9: Thoughts of better off dead/self-harm past 2 weeks 0   PHQ-9 Total Score 2   Difficulty at work, home, or with people Not difficult at all     JHONY-7  7/20/2021   1. Feeling nervous, anxious, or on edge 1   2. Not being able to stop or control worrying 1   3. Worrying too much about different things 1   4. Trouble relaxing 1   5. Being so restless that it is hard to sit still 0   6. Becoming easily annoyed or irritable 0   7. Feeling afraid, as if something awful might happen 1   JHONY-7 Total Score 5   If you checked any problems, how difficult have they made it for you to do your work, take care of things at home, or get along with other people? Not difficult at all       Suicide Assessment Five-step Evaluation and Treatment (SAFE-T)     She is        Review of Systems          Objective            Vitals:  No vitals were obtained today due to virtual visit.    Physical Exam   GENERAL: Healthy, alert and no distress  PSYCH: Mentation appears normal, affect normal/bright, judgement and insight intact, normal speech            Telephone visit duration: 16 minutes

## 2021-07-21 ASSESSMENT — ANXIETY QUESTIONNAIRES: GAD7 TOTAL SCORE: 5

## 2021-08-01 DIAGNOSIS — F32.0 MILD MAJOR DEPRESSION (H): ICD-10-CM

## 2021-08-01 DIAGNOSIS — F41.1 GAD (GENERALIZED ANXIETY DISORDER): ICD-10-CM

## 2021-08-04 NOTE — TELEPHONE ENCOUNTER
Routing refill request to provider for review/approval because:  --Last order in chart 7/20/21 per Arnoldo is for decreased dose of 150 mg.  --Previous order 7/5/21 per Arnoldo is still active for 300 mg.  --Pharmacy asking for refill on 300 mg.  --I am not finding notes in 7/20/21 office visit about dose change.  --I attempted to call patient to find out what dose she is taking/wanting but no answer.    -- is patient to be on 150 mg per day?  --Was patient going to choose and we need to continue to try an reach her?      PHQ 5/13/2019 8/3/2020 7/20/2021   PHQ-9 Total Score 4 0 2   Q9: Thoughts of better off dead/self-harm past 2 weeks Not at all Not at all Not at all

## 2021-08-04 NOTE — TELEPHONE ENCOUNTER
We talked about tapering wellbutrin and trying buspar, however I don't know that she planned on starting the taper immediately. I do know that sometimes patients say they did not request a refill, so I would recommend attempting to reach her again to make sure she does want a refill of the wellbutrin 300mg dose. Marcy Mclaughlin M.D.

## 2021-08-17 ENCOUNTER — VIRTUAL VISIT (OUTPATIENT)
Dept: FAMILY MEDICINE | Facility: CLINIC | Age: 56
End: 2021-08-17
Payer: COMMERCIAL

## 2021-08-17 DIAGNOSIS — F32.0 MILD MAJOR DEPRESSION (H): ICD-10-CM

## 2021-08-17 DIAGNOSIS — F41.1 GAD (GENERALIZED ANXIETY DISORDER): Primary | ICD-10-CM

## 2021-08-17 LAB — COLOGUARD-ABSTRACT: NEGATIVE

## 2021-08-17 PROCEDURE — 99213 OFFICE O/P EST LOW 20 MIN: CPT | Mod: 95 | Performed by: FAMILY MEDICINE

## 2021-08-17 PROCEDURE — 96127 BRIEF EMOTIONAL/BEHAV ASSMT: CPT | Mod: 59 | Performed by: FAMILY MEDICINE

## 2021-08-17 ASSESSMENT — PATIENT HEALTH QUESTIONNAIRE - PHQ9
5. POOR APPETITE OR OVEREATING: SEVERAL DAYS
SUM OF ALL RESPONSES TO PHQ QUESTIONS 1-9: 2

## 2021-08-17 ASSESSMENT — ANXIETY QUESTIONNAIRES
3. WORRYING TOO MUCH ABOUT DIFFERENT THINGS: SEVERAL DAYS
1. FEELING NERVOUS, ANXIOUS, OR ON EDGE: NOT AT ALL
6. BECOMING EASILY ANNOYED OR IRRITABLE: NOT AT ALL
7. FEELING AFRAID AS IF SOMETHING AWFUL MIGHT HAPPEN: SEVERAL DAYS
GAD7 TOTAL SCORE: 5
IF YOU CHECKED OFF ANY PROBLEMS ON THIS QUESTIONNAIRE, HOW DIFFICULT HAVE THESE PROBLEMS MADE IT FOR YOU TO DO YOUR WORK, TAKE CARE OF THINGS AT HOME, OR GET ALONG WITH OTHER PEOPLE: NOT DIFFICULT AT ALL
2. NOT BEING ABLE TO STOP OR CONTROL WORRYING: SEVERAL DAYS
5. BEING SO RESTLESS THAT IT IS HARD TO SIT STILL: SEVERAL DAYS

## 2021-08-17 NOTE — PROGRESS NOTES
Rehana is a 56 year old who is being evaluated via a billable telephone visit.      What phone number would you like to be contacted at?   Home Phone [phone]   Mobile [phone]       How would you like to obtain your AVS? MyChart    Assessment & Plan          JHONY (generalized anxiety disorder)  Improved since tapering wellbutrin. Has not started buspar. Yesterday was last dose of wellbutrin 150mg, so will start buspar today. Instructions for titration reviewed with her today. Follow up with me in two weeks virtually    Not well controlled. Will taper off bupropion to 150mg daily for a week, then stop. Then she will start buspar 5mg twice daily for 3 days, then 10mg am and 5mg pm for 3 days, then 10mg twice daily. She will follow up with me in a month. Discussed possible side effects. She will let me know if she has any concerns in the meantime.   - escitalopram (LEXAPRO) 20 MG tablet  Dispense: 90 tablet; Refill: 3  - hydrOXYzine (ATARAX) 25 MG tablet  Dispense: 60 tablet; Refill: 3  - busPIRone (BUSPAR) 5 MG tablet  Dispense: 120 tablet; Refill: 1   Mild major depression (H)  Well controlled and tolerating taper off wellbutrin. Continue lexapro 20mg daily.            Patient Instructions   Start buspar 5mg twice daily for 3 days, then 10mg am and 5mg pm for 3 days, then 10mg twice daily.      No follow-ups on file.    Marcy Mclaughlin MD, MD  [facility]    Subjective   Rehana is a 56 year old who presents for the following health issues     HPI     Depression and Anxiety Follow-Up    How are you doing with your depression since your last visit? No change    How are you doing with your anxiety since your last visit?  Improved      Are you having other symptoms that might be associated with depression or anxiety? No    Have you had a significant life event? No     Do you have any concerns with your use of alcohol or other drugs? No    Social History     Tobacco Use     Smoking status:  Never Smoker     Smokeless tobacco: Never Used   Substance Use Topics     Alcohol use: Yes     Comment: 1 beers per day     Drug use: No     PHQ 8/3/2020 7/20/2021 8/17/2021   PHQ-9 Total Score 0 2 2   Q9: Thoughts of better off dead/self-harm past 2 weeks Not at all Not at all Not at all     JHONY-7 SCORE 8/3/2020 7/20/2021 8/17/2021   Total Score - - -   Total Score - - -   Total Score 3 5 5     Last PHQ-9 8/17/2021   1.  Little interest or pleasure in doing things 0   2.  Feeling down, depressed, or hopeless 0   3.  Trouble falling or staying asleep, or sleeping too much 0   4.  Feeling tired or having little energy 1   5.  Poor appetite or overeating 0   6.  Feeling bad about yourself 1   7.  Trouble concentrating 0   8.  Moving slowly or restless 0   Q9: Thoughts of better off dead/self-harm past 2 weeks 0   PHQ-9 Total Score 2   Difficulty at work, home, or with people Not difficult at all     JHONY-7  8/17/2021   1. Feeling nervous, anxious, or on edge 0   2. Not being able to stop or control worrying 1   3. Worrying too much about different things 1   4. Trouble relaxing 1   5. Being so restless that it is hard to sit still 1   6. Becoming easily annoyed or irritable 0   7. Feeling afraid, as if something awful might happen 1   JHONY-7 Total Score 5   If you checked any problems, how difficult have they made it for you to do your work, take care of things at home, or get along with other people? Not difficult at all       Suicide Assessment Five-step Evaluation and Treatment (SAFE-T)       How many servings of fruits and vegetables do you eat daily?  2-3    On average, how many sweetened beverages do you drink each day (Examples: soda, juice, sweet tea, etc.  Do NOT count diet or artificially sweetened beverages)?   0    How many days per week do you exercise enough to make your heart beat faster? 7    How many minutes a day do you exercise enough to make your heart beat faster? 30 - 60    How many days per week  "do you miss taking your medication? 0    Last dose of wellbutrin 150mg yesterday. has not started buspar yet.     Review of Systems         Objective    Vitals - Patient Reported  Weight (Patient Reported): 70.3 kg (155 lb)  Height (Patient Reported): 177.8 cm (5' 10\")  BMI (Based on Pt Reported Ht/Wt): 22.24      Vitals:  No vitals were obtained today due to virtual visit.    Physical Exam   healthy, alert and no distress  PSYCH: Alert and oriented times 3; coherent speech, normal   rate and volume, able to articulate logical thoughts, able   to abstract reason, no tangential thoughts, no hallucinations   or delusions  Her affect is normal  RESP: No cough, no audible wheezing, able to talk in full sentences  Remainder of exam unable to be completed due to telephone visits            Phone call duration: 6 minutes  "

## 2021-08-17 NOTE — PATIENT INSTRUCTIONS
Start buspar 5mg twice daily for 3 days, then 10mg am and 5mg pm for 3 days, then 10mg twice daily.

## 2021-08-18 ASSESSMENT — ANXIETY QUESTIONNAIRES: GAD7 TOTAL SCORE: 5

## 2021-08-30 NOTE — RESULT ENCOUNTER NOTE
Excellent! Please call or send a [employer] message if you have any questions. Marcy Mclaughlin M.D.

## 2021-09-08 RX ORDER — BUPROPION HYDROCHLORIDE 300 MG/1
TABLET ORAL
Qty: 30 TABLET | Refills: 0 | OUTPATIENT
Start: 2021-09-08

## 2021-09-08 RX ORDER — BUSPIRONE HYDROCHLORIDE 5 MG/1
10 TABLET ORAL 2 TIMES DAILY
Qty: 120 TABLET | Refills: 1 | COMMUNITY
Start: 2021-09-08 | End: 2021-10-12

## 2021-09-08 NOTE — TELEPHONE ENCOUNTER
Let Rehana know to schedule virtual follow up with me in a month, earlier as needed. Marcy Mclaughlin M.D.

## 2021-09-08 NOTE — TELEPHONE ENCOUNTER
Pt didn't read the mychart dated 8/5/21.  I called pt.  She is off the wellbutrin. Sent refusal to pharmacy.  Pt is on the buspar 10 mg bid.  It is working great!!    I updated her med list.  Dr. Mclaughlin- when do you want pt to f/u with you? Nothing is scheduled.  OK to send response to reception.  Thanks!  YUNIER Miner

## 2021-10-02 ENCOUNTER — HEALTH MAINTENANCE LETTER (OUTPATIENT)
Age: 56
End: 2021-10-02

## 2021-10-12 ENCOUNTER — VIRTUAL VISIT (OUTPATIENT)
Dept: FAMILY MEDICINE | Facility: CLINIC | Age: 56
End: 2021-10-12
Payer: COMMERCIAL

## 2021-10-12 DIAGNOSIS — F32.0 MILD MAJOR DEPRESSION (H): ICD-10-CM

## 2021-10-12 DIAGNOSIS — F41.1 GAD (GENERALIZED ANXIETY DISORDER): ICD-10-CM

## 2021-10-12 PROCEDURE — 99214 OFFICE O/P EST MOD 30 MIN: CPT | Mod: 95 | Performed by: FAMILY MEDICINE

## 2021-10-12 PROCEDURE — 96127 BRIEF EMOTIONAL/BEHAV ASSMT: CPT | Mod: 59 | Performed by: FAMILY MEDICINE

## 2021-10-12 RX ORDER — BUSPIRONE HYDROCHLORIDE 10 MG/1
10 TABLET ORAL 2 TIMES DAILY
Qty: 180 TABLET | Refills: 3 | Status: SHIPPED | OUTPATIENT
Start: 2021-10-12 | End: 2022-11-30

## 2021-10-12 RX ORDER — ESCITALOPRAM OXALATE 20 MG/1
20 TABLET ORAL DAILY
Qty: 90 TABLET | Refills: 3 | Status: SHIPPED | OUTPATIENT
Start: 2021-10-12 | End: 2022-10-21

## 2021-10-12 RX ORDER — HYDROXYZINE HYDROCHLORIDE 25 MG/1
25 TABLET, FILM COATED ORAL
Qty: 60 TABLET | Refills: 3 | Status: SHIPPED | OUTPATIENT
Start: 2021-10-12 | End: 2022-06-07

## 2021-10-12 RX ORDER — BUSPIRONE HYDROCHLORIDE 5 MG/1
10 TABLET ORAL 2 TIMES DAILY
Qty: 120 TABLET | Refills: 1 | Status: CANCELLED | OUTPATIENT
Start: 2021-10-12

## 2021-10-12 ASSESSMENT — ANXIETY QUESTIONNAIRES
7. FEELING AFRAID AS IF SOMETHING AWFUL MIGHT HAPPEN: SEVERAL DAYS
5. BEING SO RESTLESS THAT IT IS HARD TO SIT STILL: NOT AT ALL
3. WORRYING TOO MUCH ABOUT DIFFERENT THINGS: SEVERAL DAYS
IF YOU CHECKED OFF ANY PROBLEMS ON THIS QUESTIONNAIRE, HOW DIFFICULT HAVE THESE PROBLEMS MADE IT FOR YOU TO DO YOUR WORK, TAKE CARE OF THINGS AT HOME, OR GET ALONG WITH OTHER PEOPLE: NOT DIFFICULT AT ALL
6. BECOMING EASILY ANNOYED OR IRRITABLE: NOT AT ALL
1. FEELING NERVOUS, ANXIOUS, OR ON EDGE: SEVERAL DAYS
GAD7 TOTAL SCORE: 5
2. NOT BEING ABLE TO STOP OR CONTROL WORRYING: SEVERAL DAYS

## 2021-10-12 ASSESSMENT — PATIENT HEALTH QUESTIONNAIRE - PHQ9
SUM OF ALL RESPONSES TO PHQ QUESTIONS 1-9: 2
5. POOR APPETITE OR OVEREATING: SEVERAL DAYS

## 2021-10-12 NOTE — PROGRESS NOTES
Rehana is a 56 year old who is being evaluated via a billable telephone visit.      What phone number would you like to be contacted at? [phone]  How would you like to obtain your AVS? MyChart    Assessment & Plan       JHONY (generalized anxiety disorder)   improved. Really feels the buspar is helping. Denies side effects.  I will change the BuSpar tablet to 10 mg twice daily.  Continue Lexapro 20 mg daily.    Improved since tapering wellbutrin. Has not started buspar. Yesterday was last dose of wellbutrin 150mg, so will start buspar today. Instructions for titration reviewed with her today. Follow up with me in two weeks virtually     Not well controlled. Will taper off bupropion to 150mg daily for a week, then stop. Then she will start buspar 5mg twice daily for 3 days, then 10mg am and 5mg pm for 3 days, then 10mg twice daily. She will follow up with me in a month. Discussed possible side effects. She will let me know if she has any concerns in the meantime.   - escitalopram (LEXAPRO) 20 MG tablet  Dispense: 90 tablet; Refill: 3  - hydrOXYzine (ATARAX) 25 MG tablet  Dispense: 60 tablet; Refill: 3  - busPIRone (BUSPAR) 5 MG tablet  Dispense: 120 tablet; Refill: 1     Mild major depression (H)  Well controlled. Continue lexapro 20mg daily.      She will follow me with me in 6 months for her prior visit.           Return in about 6 months (around 4/12/2022) for Routine preventive.    Marcy Mlcaughlin MD, MD  [facility]   Rehana is a 56 year old who presents for the following health issues     HPI     Depression and Anxiety Follow-Up    How are you doing with your depression since your last visit? Improved     How are you doing with your anxiety since your last visit?  Improved     Are you having other symptoms that might be associated with depression or anxiety? No    Have you had a significant life event? No     Do you have any concerns with your use of alcohol or other  drugs? No    Social History     Tobacco Use     Smoking status: Never Smoker     Smokeless tobacco: Never Used   Substance Use Topics     Alcohol use: Yes     Comment: 1 beers per day     Drug use: No     PHQ 7/20/2021 8/17/2021 10/12/2021   PHQ-9 Total Score 2 2 2   Q9: Thoughts of better off dead/self-harm past 2 weeks Not at all Not at all Not at all     JHONY-7 SCORE 7/20/2021 8/17/2021 10/12/2021   Total Score - - -   Total Score - - -   Total Score 5 5 5         Suicide Assessment Five-step Evaluation and Treatment (SAFE-T)         Review of Systems          Objective           Vitals:  No vitals were obtained today due to virtual visit.    Physical Exam   healthy, alert and no distress  PSYCH: Alert and oriented times 3; coherent speech, normal   rate and volume, able to articulate logical thoughts, able   to abstract reason, no tangential thoughts, no hallucinations   or delusions  Her affect is normal  RESP: No cough, no audible wheezing, able to talk in full sentences  Remainder of exam unable to be completed due to telephone visits                  Phone call duration: 6 minutes

## 2021-10-13 ASSESSMENT — ANXIETY QUESTIONNAIRES: GAD7 TOTAL SCORE: 5

## 2022-01-22 ENCOUNTER — HEALTH MAINTENANCE LETTER (OUTPATIENT)
Age: 57
End: 2022-01-22

## 2022-04-03 ENCOUNTER — NURSE TRIAGE (OUTPATIENT)
Dept: NURSING | Facility: CLINIC | Age: 57
End: 2022-04-03
Payer: COMMERCIAL

## 2022-04-03 ENCOUNTER — OFFICE VISIT (OUTPATIENT)
Dept: URGENT CARE | Facility: URGENT CARE | Age: 57
End: 2022-04-03
Payer: COMMERCIAL

## 2022-04-03 ENCOUNTER — ANCILLARY PROCEDURE (OUTPATIENT)
Dept: GENERAL RADIOLOGY | Facility: CLINIC | Age: 57
End: 2022-04-03
Attending: STUDENT IN AN ORGANIZED HEALTH CARE EDUCATION/TRAINING PROGRAM
Payer: COMMERCIAL

## 2022-04-03 VITALS
BODY MASS INDEX: 21.47 KG/M2 | SYSTOLIC BLOOD PRESSURE: 142 MMHG | TEMPERATURE: 98.4 F | OXYGEN SATURATION: 99 % | WEIGHT: 150 LBS | DIASTOLIC BLOOD PRESSURE: 91 MMHG | HEIGHT: 70 IN | HEART RATE: 79 BPM

## 2022-04-03 DIAGNOSIS — R07.89 OTHER CHEST PAIN: ICD-10-CM

## 2022-04-03 DIAGNOSIS — R07.89 OTHER CHEST PAIN: Primary | ICD-10-CM

## 2022-04-03 DIAGNOSIS — R00.2 PALPITATIONS: ICD-10-CM

## 2022-04-03 DIAGNOSIS — B00.1 COLD SORE: ICD-10-CM

## 2022-04-03 DIAGNOSIS — R53.83 FATIGUE, UNSPECIFIED TYPE: ICD-10-CM

## 2022-04-03 LAB
ANION GAP SERPL CALCULATED.3IONS-SCNC: 2 MMOL/L (ref 3–14)
BUN SERPL-MCNC: 9 MG/DL (ref 7–30)
CALCIUM SERPL-MCNC: 9.5 MG/DL (ref 8.5–10.1)
CHLORIDE BLD-SCNC: 110 MMOL/L (ref 94–109)
CO2 SERPL-SCNC: 29 MMOL/L (ref 20–32)
CREAT SERPL-MCNC: 0.68 MG/DL (ref 0.52–1.04)
ERYTHROCYTE [DISTWIDTH] IN BLOOD BY AUTOMATED COUNT: 11.9 % (ref 10–15)
GFR SERPL CREATININE-BSD FRML MDRD: >90 ML/MIN/1.73M2
GLUCOSE BLD-MCNC: 115 MG/DL (ref 70–99)
HCT VFR BLD AUTO: 40.4 % (ref 35–47)
HGB BLD-MCNC: 13.4 G/DL (ref 11.7–15.7)
MCH RBC QN AUTO: 30 PG (ref 26.5–33)
MCHC RBC AUTO-ENTMCNC: 33.2 G/DL (ref 31.5–36.5)
MCV RBC AUTO: 91 FL (ref 78–100)
PLATELET # BLD AUTO: 210 10E3/UL (ref 150–450)
POTASSIUM BLD-SCNC: 3.5 MMOL/L (ref 3.4–5.3)
RBC # BLD AUTO: 4.46 10E6/UL (ref 3.8–5.2)
SODIUM SERPL-SCNC: 141 MMOL/L (ref 133–144)
WBC # BLD AUTO: 4.2 10E3/UL (ref 4–11)

## 2022-04-03 PROCEDURE — 36415 COLL VENOUS BLD VENIPUNCTURE: CPT | Performed by: STUDENT IN AN ORGANIZED HEALTH CARE EDUCATION/TRAINING PROGRAM

## 2022-04-03 PROCEDURE — 93000 ELECTROCARDIOGRAM COMPLETE: CPT | Performed by: STUDENT IN AN ORGANIZED HEALTH CARE EDUCATION/TRAINING PROGRAM

## 2022-04-03 PROCEDURE — 80048 BASIC METABOLIC PNL TOTAL CA: CPT | Performed by: STUDENT IN AN ORGANIZED HEALTH CARE EDUCATION/TRAINING PROGRAM

## 2022-04-03 PROCEDURE — 71046 X-RAY EXAM CHEST 2 VIEWS: CPT | Performed by: RADIOLOGY

## 2022-04-03 PROCEDURE — 99215 OFFICE O/P EST HI 40 MIN: CPT | Performed by: STUDENT IN AN ORGANIZED HEALTH CARE EDUCATION/TRAINING PROGRAM

## 2022-04-03 PROCEDURE — 85027 COMPLETE CBC AUTOMATED: CPT | Performed by: STUDENT IN AN ORGANIZED HEALTH CARE EDUCATION/TRAINING PROGRAM

## 2022-04-03 NOTE — PROGRESS NOTES
"Assessment & Plan     Palpitations  Atypical chest pain discomfort   Fatigue, unspecified type  - EKG 12-lead obtained in clinic, reviewed by me, showing normal sinus rhythm with ventricular rate of 75 bpm, no ST changes, normal axis, no abnormal morphologies  - Chest 2 View obtained, reviewed by me, no apparent fractures or consolidations. Formal read: \"negative chest\"  - CBC with platelets to eval for anemia, basic metabolic panel to eval for metabolic derangement  - Both CBC and BMP reviewed, unremarkable, Hgb of 13.4, no evidence of uremia, sodium/calcium rearrangement to explain symptoms  - Suspect symptoms related to decreased PO intake, sleep disturbance, and anxiety. No red flag signs/sypmtoms on evaluation today to suggest more severe etiology. Ddx includes GERD, PUD.   - Recommended three full meals per day with snacks in between, as well as hydration with at least 4-6 glasses of water  - Advised follow up with PCP to discuss symptoms further    Cold sore  - Recommeded avoiding poking or irritating the lesion  - Discussed topical symptom control with Abrevo    Patient was advised to return to clinic if symptoms do not improve in the amount of time specified in the AVS or if symptoms worsen. Patient educated on red flag symptoms and asked to go directly to the ED if symptoms present themselves.        45 minutes spent on the date of the encounter doing chart review, history and exam, documentation and further activities per the note            Juancarlos Gong MD   [facility]    Damián Kimball is a 57 year old female who presents to clinic today for the following health issues:    Chief Complaint   Patient presents with     Urgent Care     Pt in clinic to have eval for heart palpatations for 3 days.  Pt is also c/o possible anemia.     Anemia     Negative Covid 4/1/2022     Palpitations     HPI    Patient reports fatigue and nonspecific discomfort in her chest, and well as increasing " "anxiety for the last 1-2 weeks. Chest sometimes feelings burning, but does not occur after eating. No palpitations, irregular beats, no syncope. She is concerned about anemia. No SOB or syncope. Symptoms do not occur with activity or exercise.       Patient also reports a \"polyp\" on her gum, and she is concerned for infection. Noticed changes on her gum off/on for the last 6 months. Using salt water rinses. She has poked and drained the lesion many times. Denies fevers, chills. No pain at the gums. No odynophagia or lymph node swelling that she has noticed.         Patient Active Problem List    Diagnosis Date Noted     Female stress incontinence 12/02/2014     Priority: Medium     JHONY (generalized anxiety disorder) 07/09/2014     Priority: Medium     Benign heart murmur      Priority: Medium     checked in 2006, told benign       CARDIOVASCULAR SCREENING; LDL GOAL LESS THAN 160 08/09/2011     Priority: Medium     Mild major depression (H) 08/09/2011     Priority: Medium       Current Outpatient Medications   Medication     busPIRone (BUSPAR) 10 MG tablet     escitalopram (LEXAPRO) 20 MG tablet     hydrOXYzine (ATARAX) 25 MG tablet     No current facility-administered medications for this visit.           Objective    BP (!) 142/91   Pulse 79   Temp 98.4  F (36.9  C) (Temporal)   Ht 1.778 m (5' 10\")   Wt 68 kg (150 lb)   LMP 04/22/2013   SpO2 99%   BMI 21.52 kg/m    Physical Exam     GENERAL: Alert and no distress  EYES: Eyes grossly normal to inspection, PERRL and conjunctivae and sclerae normal  NECK: no adenopathy, no asymmetry, supple  MOUTH: 2mm clear vesicle on anterior maxillary gum near midline without purulence or erythema.   RESP: lungs clear to auscultation - no rales, rhonchi or wheezes  CV: regular rate and rhythm, normal S1 S2, no S3 or S4, no murmur, click or rub, peripheral pulses strong  ABDOMEN: soft, nontender, nondistended  MSK: no gross musculoskeletal defects noted, no edema    Results " for orders placed or performed in visit on 04/03/22   XR Chest 2 Views     Status: None    Narrative    EXAM: XR CHEST 2 VW  LOCATION: [facility]  DATE/TIME: 4/3/2022 10:49 AM    INDICATION: Chest pain  COMPARISON: 12/08/2014      Impression    IMPRESSION: Negative chest with no significant change.   Results for orders placed or performed in visit on 04/03/22   CBC with platelets     Status: Normal   Result Value Ref Range    WBC Count 4.2 4.0 - 11.0 10e3/uL    RBC Count 4.46 3.80 - 5.20 10e6/uL    Hemoglobin 13.4 11.7 - 15.7 g/dL    Hematocrit 40.4 35.0 - 47.0 %    MCV 91 78 - 100 fL    MCH 30.0 26.5 - 33.0 pg    MCHC 33.2 31.5 - 36.5 g/dL    RDW 11.9 10.0 - 15.0 %    Platelet Count 210 150 - 450 10e3/uL   Basic metabolic panel  (Ca, Cl, CO2, Creat, Gluc, K, Na, BUN)     Status: Abnormal   Result Value Ref Range    Sodium 141 133 - 144 mmol/L    Potassium 3.5 3.4 - 5.3 mmol/L    Chloride 110 (H) 94 - 109 mmol/L    Carbon Dioxide (CO2) 29 20 - 32 mmol/L    Anion Gap 2 (L) 3 - 14 mmol/L    Urea Nitrogen 9 7 - 30 mg/dL    Creatinine 0.68 0.52 - 1.04 mg/dL    Calcium 9.5 8.5 - 10.1 mg/dL    Glucose 115 (H) 70 - 99 mg/dL    GFR Estimate >90 >60 mL/min/1.73m2                     The use of [employer] services may have been used to construct the content in this note; any grammatical or spelling errors are non-intentional. Please contact the author of this note directly if you are in need of any clarification.       =================    Patient Instructions   - You will called with any abnormal lab results  - You should plan to follow up with your primary doctor in the next 1-2 weeks to discuss your symptoms further  - Avoid poking or irritating the cold sore in your mouth  - You can try Abreva over-the-counter for relief of the cold sore in your mouth      Patient Education     Anemia  Anemia is a condition that occurs when your body does not have enough healthy red blood cells  (RBCs). RBCs are the parts of your blood that carry oxygen all over your body. A protein called hemoglobin allows your RBCs to absorb and release oxygen. Without enough RBCs or hemoglobin, your body doesn't get enough oxygen. Symptoms of anemia may then occur.    What are the symptoms of anemia?  Some people with anemia have no symptoms. But most people have symptoms that range from mild to severe. These can include:    Tiredness (fatigue)    Weakness    Pale skin    Shortness of breath    Dizziness or fainting    Rapid heartbeat    Trouble doing normal amounts of activity    Yellowing of your eyes, skin, or mouth and dark urine (jaundice)  What causes anemia?  Anemia can occur when your body:    Loses too much blood    Does not make enough RBCs    Destroys your RBCs at a faster rate than it can replace them    Does not make a normal amount of hemoglobin in your RBCs  These problems can occur for many reasons, including:    A condition that you are born with (congenital or inherited), such as sickle cell disease or thalassemia    Heavy bleeding for any reason, including injury, surgery, childbirth, or even heavy menstrual periods    Being low in certain nutrients, such as iron, folate, or vitamin B-12    Certain long-term (chronic) conditions such as diabetes, arthritis, or kidney disease    Certain chronic infections such as tuberculosis or HIV    Exposure to certain medicines, such as those used for chemotherapy  There are different types of anemia. Your healthcare provider can tell you more about the type of anemia you have and what may have caused it.  How is anemia diagnosed?  To diagnose anemia, your healthcare provider orders blood tests. These can include:    Complete blood cell count (CBC). This test measures the amounts of the different types of blood cells.    Blood smear. This test checks the size and shape of your blood cells. To do the test, a drop of your blood is looked at under a microscope. A stain  is used to make the blood cells easier to see.    Iron studies. These tests measure the amount of iron in your blood. Your body needs iron to make hemoglobin in your RBCs.    Vitamin B-12 and folate studies. These tests check for some of the components that help give RBCs a normal size and shape.    Reticulocyte count. This test measures the amount of new RBCs that your bone marrow makes.    Hemoglobin electrophoresis. This test checks for problems with your hemoglobin in RBCs.    Bone marrow biopsy. This test evaluates the bone marrow where RBCs are made.  How is anemia treated?  Treatment for anemia is based on the type of anemia, its cause, and the severity of your symptoms. Treatments may include:    Diet changes. This includes increasing the amount of certain nutrients in your diet, such as iron, vitamin B-12, or folate. Your healthcare provider may also prescribe nutrient supplements.    Medicines. Certain medicines treat the cause of your anemia. Others help build new RBCs or ease symptoms. If a medicine is the cause of your anemia, you may need to stop or change it.    Blood transfusions. Replacing some of your blood can increase the number of healthy RBCs in your body.    Surgery. In some cases, your healthcare provider may do surgery to treat the underlying cause of anemia. If you need surgery, your healthcare provider will explain the procedure and outline the risks and benefits for you.  What are the long-term concerns?  If you have a certain type of anemia, you can expect a full recovery after treatment. If you have other types of anemia (especially a type you're born with), you will need to manage it for life. Your healthcare provider can tell you more.  [employer] last reviewed this educational content on 4/1/2019 2000-2021 [employer]. All rights reserved. This information is not intended as a substitute for professional medical care. Always follow your healthcare professional's  instructions.           Patient Education     Understanding Cold Sores  Cold sores (fever blisters) are small sores or blisters on the lip. Sometimes they are inside the mouth. Many people get them from time to time. Cold sores often are not serious. They often heal in a few days, sometimes longer. They are caused by 2 related viruses, herpes simplex type 1 (HSV-1) and herpes simplex type 2 (HSV-2). These viruses spread very easily. Many people have one or both of these viruses in their body. More than 4 in every 5 people are infected with HSV-1. This is also the most common cause of cold sores. Once you have the virus that causes cold sores, it stays in your body for the rest of your life. But it can be inactive for long periods.   What causes a cold sore?  Cold sores are often caused by HSV-1. In some cases they are caused by HSV-2. HSV-2 is the more common cause of genital sores. The herpes viruses can enter the body through a break in the skin such as a scrape. Or they may enter through mucous membranes such as the lips or mouth. Some ways to get the viruses include:     Kissing someone who has a cold sore    Sharing a drinking glass, eating utensils, or lip balm with someone who has a cold sore    Having sex with someone who has a cold sore  A  baby can also get the infection at birth.  If you have a herpes virus, you can pass it along even when you don t have a sore.   Cold sores flare up from time to time. Things that can cause an outbreak include:     Sun exposure    Fever    Stress or exhaustion    Menstruation    Skin irritation    Another unrelated illness such as pneumonia, urinary infection, or cancer  What are the symptoms of a cold sore?  Symptoms can include:    A blister-like sore or cluster of sores. These often occur at the edge of the lips but may appear inside the mouth.    Skin redness around the sores.    Pain or itching in the area of the outbreak. Often the pain or itching starts 12  to 24 hours before the sore is visible.    Flu-like symptoms, including swollen glands, headache, body ache, or fever. These typically occur only at the time of the first infection.  Cold sores may also occur on fingers. They may rarely infect the eyes, a serious possible complication.   Some people have symptoms a day or 2 before an outbreak. They may feel soreness, burning, itching, or tingling before a cold sore appears. Cold sores often come back in the same area that they first appeared.   How are cold sores treated?  Treatment for cold sores focuses on easing and shortening symptoms. For people with frequent outbreaks, treatment works to decrease how often and how symptomatic future episodes will be. Treatments may include:     Prescription or over-the-counter pain medicines. These can help with mild pain, especially if sores are inside the mouth.    Antiviral medicines. These may be pills that are taken by mouth or a cream to apply to sores. They may help shorten an outbreak and reduce the severity of symptoms. They may be used to help prevent future outbreaks if you have infections that keep coming back.    Self-care such as extra rest and drinking more fluids. These may help ease the flu-like symptoms of a first outbreak.  How are cold sores diagnosed?  Your healthcare provider makes the diagnosis mainly by looking at the sores and doing a clinical exam. This may be confirmed by swab tests or blood tests.   How can I prevent cold sores?  You can help reduce the spread of the herpes viruses that cause cold sores. This can help prevent both you and others from getting cold sores. Follow these tips:     Don't kiss others if you have a cold sore. Also don't kiss someone with a cold sore.    Don't share eating utensils, lip balm, razors, or towels with someone who has a cold sore.    Wash your hands after touching the area of a cold sore. The herpes virus can be carried from your face to your hands when you  touch the area of a cold sore. When this happens, wash your hands thoroughly, for at least 20 seconds. When you can t wash with soap and water, use an alcohol-based hand .    Disinfect things you touch often, such as phones and keyboards.      If you feel a cold sore coming on, do the same things you would do when a cold sore is present to prevent spreading the virus.    Use condoms to help prevent passing on the viruses through sex.  What are the possible complications of a cold sore?  Cold sores often go away by themselves within a few days. In some cases it may take 1 to 2 weeks or longer. For most people, cold sores are not serious. But the viruses that cause cold sores can cause more serious illness. People who have a weak immune system may get more serious infections from herpes viruses. These include people being treated for cancer or people who have HIV. Babies may also become very ill from a herpes infection.    When should I call my healthcare provider?  Call your healthcare provider right away if you have any of these:    Fever of 100.4 F (38 C) or higher, or as directed by your provider    Pain that gets worse    You can't eat or drink because of painful sores    Symptoms don t get better in 5 to 7 days    Blisters spread beyond the mouth or lip to areas on the chest, arms, face (especially the eyes), or legs  [employer] last reviewed this educational content on 6/1/2019 2000-2021 [employer]. All rights reserved. This information is not intended as a substitute for professional medical care. Always follow your healthcare professional's instructions.

## 2022-04-03 NOTE — PATIENT INSTRUCTIONS
- You will called with any abnormal lab results  - You should plan to follow up with your primary doctor in the next 1-2 weeks to discuss your symptoms further  - Avoid poking or irritating the cold sore in your mouth  - You can try Abreva over-the-counter for relief of the cold sore in your mouth      Patient Education     Anemia  Anemia is a condition that occurs when your body does not have enough healthy red blood cells (RBCs). RBCs are the parts of your blood that carry oxygen all over your body. A protein called hemoglobin allows your RBCs to absorb and release oxygen. Without enough RBCs or hemoglobin, your body doesn't get enough oxygen. Symptoms of anemia may then occur.    What are the symptoms of anemia?  Some people with anemia have no symptoms. But most people have symptoms that range from mild to severe. These can include:    Tiredness (fatigue)    Weakness    Pale skin    Shortness of breath    Dizziness or fainting    Rapid heartbeat    Trouble doing normal amounts of activity    Yellowing of your eyes, skin, or mouth and dark urine (jaundice)  What causes anemia?  Anemia can occur when your body:    Loses too much blood    Does not make enough RBCs    Destroys your RBCs at a faster rate than it can replace them    Does not make a normal amount of hemoglobin in your RBCs  These problems can occur for many reasons, including:    A condition that you are born with (congenital or inherited), such as sickle cell disease or thalassemia    Heavy bleeding for any reason, including injury, surgery, childbirth, or even heavy menstrual periods    Being low in certain nutrients, such as iron, folate, or vitamin B-12    Certain long-term (chronic) conditions such as diabetes, arthritis, or kidney disease    Certain chronic infections such as tuberculosis or HIV    Exposure to certain medicines, such as those used for chemotherapy  There are different types of anemia. Your healthcare provider can tell you more  about the type of anemia you have and what may have caused it.  How is anemia diagnosed?  To diagnose anemia, your healthcare provider orders blood tests. These can include:    Complete blood cell count (CBC). This test measures the amounts of the different types of blood cells.    Blood smear. This test checks the size and shape of your blood cells. To do the test, a drop of your blood is looked at under a microscope. A stain is used to make the blood cells easier to see.    Iron studies. These tests measure the amount of iron in your blood. Your body needs iron to make hemoglobin in your RBCs.    Vitamin B-12 and folate studies. These tests check for some of the components that help give RBCs a normal size and shape.    Reticulocyte count. This test measures the amount of new RBCs that your bone marrow makes.    Hemoglobin electrophoresis. This test checks for problems with your hemoglobin in RBCs.    Bone marrow biopsy. This test evaluates the bone marrow where RBCs are made.  How is anemia treated?  Treatment for anemia is based on the type of anemia, its cause, and the severity of your symptoms. Treatments may include:    Diet changes. This includes increasing the amount of certain nutrients in your diet, such as iron, vitamin B-12, or folate. Your healthcare provider may also prescribe nutrient supplements.    Medicines. Certain medicines treat the cause of your anemia. Others help build new RBCs or ease symptoms. If a medicine is the cause of your anemia, you may need to stop or change it.    Blood transfusions. Replacing some of your blood can increase the number of healthy RBCs in your body.    Surgery. In some cases, your healthcare provider may do surgery to treat the underlying cause of anemia. If you need surgery, your healthcare provider will explain the procedure and outline the risks and benefits for you.  What are the long-term concerns?  If you have a certain type of anemia, you can expect a full  recovery after treatment. If you have other types of anemia (especially a type you're born with), you will need to manage it for life. Your healthcare provider can tell you more.  [employer] last reviewed this educational content on 2019-2021 [employer]. All rights reserved. This information is not intended as a substitute for professional medical care. Always follow your healthcare professional's instructions.           Patient Education     Understanding Cold Sores  Cold sores (fever blisters) are small sores or blisters on the lip. Sometimes they are inside the mouth. Many people get them from time to time. Cold sores often are not serious. They often heal in a few days, sometimes longer. They are caused by 2 related viruses, herpes simplex type 1 (HSV-1) and herpes simplex type 2 (HSV-2). These viruses spread very easily. Many people have one or both of these viruses in their body. More than 4 in every 5 people are infected with HSV-1. This is also the most common cause of cold sores. Once you have the virus that causes cold sores, it stays in your body for the rest of your life. But it can be inactive for long periods.   What causes a cold sore?  Cold sores are often caused by HSV-1. In some cases they are caused by HSV-2. HSV-2 is the more common cause of genital sores. The herpes viruses can enter the body through a break in the skin such as a scrape. Or they may enter through mucous membranes such as the lips or mouth. Some ways to get the viruses include:     Kissing someone who has a cold sore    Sharing a drinking glass, eating utensils, or lip balm with someone who has a cold sore    Having sex with someone who has a cold sore  A  baby can also get the infection at birth.  If you have a herpes virus, you can pass it along even when you don t have a sore.   Cold sores flare up from time to time. Things that can cause an outbreak include:     Sun exposure    Fever    Stress  or exhaustion    Menstruation    Skin irritation    Another unrelated illness such as pneumonia, urinary infection, or cancer  What are the symptoms of a cold sore?  Symptoms can include:    A blister-like sore or cluster of sores. These often occur at the edge of the lips but may appear inside the mouth.    Skin redness around the sores.    Pain or itching in the area of the outbreak. Often the pain or itching starts 12 to 24 hours before the sore is visible.    Flu-like symptoms, including swollen glands, headache, body ache, or fever. These typically occur only at the time of the first infection.  Cold sores may also occur on fingers. They may rarely infect the eyes, a serious possible complication.   Some people have symptoms a day or 2 before an outbreak. They may feel soreness, burning, itching, or tingling before a cold sore appears. Cold sores often come back in the same area that they first appeared.   How are cold sores treated?  Treatment for cold sores focuses on easing and shortening symptoms. For people with frequent outbreaks, treatment works to decrease how often and how symptomatic future episodes will be. Treatments may include:     Prescription or over-the-counter pain medicines. These can help with mild pain, especially if sores are inside the mouth.    Antiviral medicines. These may be pills that are taken by mouth or a cream to apply to sores. They may help shorten an outbreak and reduce the severity of symptoms. They may be used to help prevent future outbreaks if you have infections that keep coming back.    Self-care such as extra rest and drinking more fluids. These may help ease the flu-like symptoms of a first outbreak.  How are cold sores diagnosed?  Your healthcare provider makes the diagnosis mainly by looking at the sores and doing a clinical exam. This may be confirmed by swab tests or blood tests.   How can I prevent cold sores?  You can help reduce the spread of the herpes viruses  that cause cold sores. This can help prevent both you and others from getting cold sores. Follow these tips:     Don't kiss others if you have a cold sore. Also don't kiss someone with a cold sore.    Don't share eating utensils, lip balm, razors, or towels with someone who has a cold sore.    Wash your hands after touching the area of a cold sore. The herpes virus can be carried from your face to your hands when you touch the area of a cold sore. When this happens, wash your hands thoroughly, for at least 20 seconds. When you can t wash with soap and water, use an alcohol-based hand .    Disinfect things you touch often, such as phones and keyboards.      If you feel a cold sore coming on, do the same things you would do when a cold sore is present to prevent spreading the virus.    Use condoms to help prevent passing on the viruses through sex.  What are the possible complications of a cold sore?  Cold sores often go away by themselves within a few days. In some cases it may take 1 to 2 weeks or longer. For most people, cold sores are not serious. But the viruses that cause cold sores can cause more serious illness. People who have a weak immune system may get more serious infections from herpes viruses. These include people being treated for cancer or people who have HIV. Babies may also become very ill from a herpes infection.    When should I call my healthcare provider?  Call your healthcare provider right away if you have any of these:    Fever of 100.4 F (38 C) or higher, or as directed by your provider    Pain that gets worse    You can't eat or drink because of painful sores    Symptoms don t get better in 5 to 7 days    Blisters spread beyond the mouth or lip to areas on the chest, arms, face (especially the eyes), or legs  [employer] last reviewed this educational content on 6/1/2019 2000-2021 [employer]. All rights reserved. This information is not intended as a substitute for  professional medical care. Always follow your healthcare professional's instructions.

## 2022-04-04 ENCOUNTER — MYC MEDICAL ADVICE (OUTPATIENT)
Dept: FAMILY MEDICINE | Facility: CLINIC | Age: 57
End: 2022-04-04
Payer: COMMERCIAL

## 2022-04-05 NOTE — TELEPHONE ENCOUNTER
Writer responded via [employer].    TENA DamonN, RN  [facility]

## 2022-05-14 ENCOUNTER — HEALTH MAINTENANCE LETTER (OUTPATIENT)
Age: 57
End: 2022-05-14

## 2022-06-07 ENCOUNTER — VIRTUAL VISIT (OUTPATIENT)
Dept: URGENT CARE | Facility: CLINIC | Age: 57
End: 2022-06-07
Payer: COMMERCIAL

## 2022-06-07 DIAGNOSIS — U07.1 CLINICAL DIAGNOSIS OF COVID-19: Primary | ICD-10-CM

## 2022-06-07 PROCEDURE — 99213 OFFICE O/P EST LOW 20 MIN: CPT | Mod: 95

## 2022-06-07 NOTE — PATIENT INSTRUCTIONS
"  Discharge Instructions for COVID-19 Patients  You have--or may have--COVID-19. Please follow the instructions listed below.   If you have a weakened immune system, discuss with your doctor any other actions you need to take.  How can I protect others?  If you have symptoms (fever, cough, body aches or trouble breathing):    Stay home and away from others (self-isolate) until:  ? Your other symptoms have resolved (gotten better). And   ? You've had no fever--and no medicine that reduces fever--for 1 full day (24 hours). And   ? At least 10 days have passed since your symptoms started. (You may need to wait 20 days. Follow the advice of your care team.)  If you don't show symptoms, but testing showed that you have COVID-19:    Stay home and away from others (self-isolate) until at least 10 days have passed since the date of your first positive COVID-19 test.  During this time    Stay in your own room, even for meals. Use your own bathroom if you can.    Stay away from others in your home. No hugging, kissing or shaking hands. No visitors.    Don't go to work, school or anywhere else.    Clean \"high touch\" surfaces often (doorknobs, counters, handles). Use household cleaning spray or wipes.    You'll find a full list of  on the EPA website: www.epa.gov/pesticide-registration/list-n-disinfectants-use-against-sars-cov-2.    Cover your mouth and nose with a mask or other face covering to avoid spreading germs.    Wash your hands and face often. Use soap and water.    Caregivers in these groups are at risk for severe illness due to COVID-19:  ? People 65 years and older  ? People who live in a nursing home or long-term care facility  ? People with chronic disease (lung, heart, cancer, diabetes, kidney, liver, immunologic)  ? People who have a weakened immune system, including those who:    Are in cancer treatment    Take medicine that weakens the immune system, such as corticosteroids    Had a bone marrow or " organ transplant    Have an immune deficiency    Have poorly controlled HIV or AIDS    Are obese (body mass index of 40 or higher)    Smoke regularly    Caregivers should wear gloves while washing dishes, handling laundry and cleaning bedrooms and bathrooms.    Use caution when washing and drying laundry: Don't shake dirty laundry and use the warmest water setting that you can.    For more tips on managing your health at home, go to www.cdc.gov/coronavirus/2019-ncov/downloads/10Things.pdf.  How can I take care of myself at home?  1. Get lots of rest. Drink extra fluids (unless a doctor has told you not to).  2. Take Tylenol (acetaminophen) for fever or pain. If you have liver or kidney problems, ask your family doctor if it's okay to take Tylenol.   Adults can take either:   ? 650 mg (two 325 mg pills) every 4 to 6 hours, or   ? 1,000 mg (two 500 mg pills) every 8 hours as needed.  ? Note: Don't take more than 3,000 mg in one day. Acetaminophen is found in many medicines (both prescribed and over-the-counter medicines). Read all labels to be sure you don't take too much.   For children, check the Tylenol bottle for the right dose. The dose is based on the child's age or weight.  3. If you have other health problems (like cancer, heart failure, an organ transplant or severe kidney disease): Call your specialty clinic if you don't feel better in the next 2 days.  4. Know when to call 911. Emergency warning signs include:  ? Trouble breathing or shortness of breath  ? Pain or pressure in the chest that doesn't go away  ? Feeling confused like you haven't felt before, or not being able to wake up  ? Bluish-colored lips or face  5. Your doctor may have prescribed a blood thinner medicine. Follow their instructions.  Where can I get more information?     [facility] [place] - About COVID-19:   [email or web address]    CDC - What to Do If You're Sick:  www.cdc.gov/coronavirus/2019-ncov/about/steps-when-sick.html    CDC - Ending Home Isolation: www.cdc.gov/coronavirus/2019-ncov/hcp/disposition-in-home-patients.html    CDC - Caring for Someone: www.cdc.gov/coronavirus/2019-ncov/if-you-are-sick/care-for-someone.html    [facility] - Interim Guidance for Hospital Discharge to Home: www.health.[facility].[place]./diseases/coronavirus/hcp/hospdischarge.pdf    Below are the COVID-19 hotlines at the [place] Department of Health ([facility]). Interpreters are available.  ? For health questions: Call 121-230-9612 or 1-111.110.5227 (7 a.m. to 7 p.m.)  ? For questions about schools and childcare: Call 272-147-7642 or 1-678.194.5712 (7 a.m. to 7 p.m.)    For informational purposes only. Not to replace the advice of your health care provider. Clinically reviewed by Dr. Slava Nevarez.   Copyright   2020 [facility]. All rights reserved. [employer] [id number] - REV 01/05/21.

## 2022-06-07 NOTE — PROGRESS NOTES
"Rehana is a 57 year old who is being evaluated via a billable telephone visit.          Assessment & Plan     Clinical diagnosis of COVID-19    Treat with Paxlovid.    - nirmatrelvir and ritonavir (PAXLOVID) therapy pack; Take 3 tablets by mouth 2 times daily for 5 days Take 2 Nirmatrelvir tablets and 1 Ritonavir tablet twice daily for 5 days.             COVID-19 positive patient.  Encounter for consideration of medication intervention. Patient does qualify for a prescription. Full discussion with patient including medication options, risks and benefits. Potential drug interactions reviewed with patient.     Treatment Planned Paxlovid, Rx sent to [place] pharmacy    Temporary change to home medications: Monitor buspar    Estimated body mass index is 21.52 kg/m  as calculated from the following:    Height as of 4/3/22: 1.778 m (5' 10\").    Weight as of 4/3/22: 68 kg (150 lb).  GFR Estimate   Date Value Ref Range Status   04/03/2022 >90 >60 mL/min/1.73m2 Final     Comment:     Effective December 21, 2021 eGFRcr in adults is calculated using the 2021 CKD-EPI creatinine equation which includes age and gender (Brii et al., NEJM, DOI: 10.1056/VWTMlj8357788)   01/24/2017 >90  Non  GFR Calc   >60 mL/min/1.7m2 Final     No results found for: [id number]    No follow-ups on file.    Virtual Urgent Care  [facility]    Subjective   Rehana is a 57 year old who presents for the following health issues     HPI       COVID-19 Symptom Review  How many days ago did these symptoms start? 6/5    Are any of the following symptoms significant for you?    New or worsening difficulty breathing? No    Worsening cough? Yes, it's a dry cough.     Fever or chills? Yes, I felt feverish or had chills.    Headache: YES    Sore throat: YES    Chest pain: no    Diarrhea: no    Body aches? YES    Nasal congestion    What treatments has patient tried? Nonsteroidals   Does patient live in a nursing home, " group home, or shelter? no  Does patient have a way to get food/medications during quarantined? Yes, I have a friend or family member who can help me.            Review of Systems   Constitutional, HEENT, cardiovascular, pulmonary, gi and gu systems are negative, except as otherwise noted.      Objective           Vitals:  No vitals were obtained today due to virtual visit.    Physical Exam   healthy, alert and no distress  PSYCH: Alert and oriented times 3; coherent speech, normal   rate and volume, able to articulate logical thoughts, able   to abstract reason, no tangential thoughts, no hallucinations   or delusions  Her affect is normal and pleasant  RESP: No cough, no audible wheezing, able to talk in full sentences  Remainder of exam unable to be completed due to telephone visits                Phone call duration: 6 minutes

## 2022-06-27 NOTE — TELEPHONE ENCOUNTER
"Requested Prescriptions   Pending Prescriptions Disp Refills     escitalopram (LEXAPRO) 20 MG tablet [Pharmacy Med Name: ESCITALOPRAM OXALATE 20MG TABS] 90 tablet 3     Sig: TAKE ONE TABLET BY MOUTH DAILY  Last Written Prescription Date:  4/25/2018  Last Fill Quantity: 90 tablet,  # refills: 3   Last Office Visit: 4/25/2018   Future Office Visit:            SSRIs Protocol Failed - 5/1/2019  4:51 PM        Failed - PHQ-9 score less than 5 in past 6 months     Please review last PHQ-9 score.   PHQ-9 SCORE 4/12/2016 11/16/2016 4/25/2018   PHQ-9 Total Score - - -   PHQ-9 Total Score MyChart - - 7 (Mild depression)   PHQ-9 Total Score 4 4 7     JHONY-7 SCORE 10/13/2015 4/12/2016 4/25/2018   Total Score - - -   Total Score - - 13 (moderate anxiety)   Total Score 8 11 13           Failed - Recent (6 mo) or future (30 days) visit within the authorizing provider's specialty     Patient had office visit in the last 6 months or has a visit in the next 30 days with authorizing provider or within the authorizing provider's specialty.  See \"Patient Info\" tab in inbasket, or \"Choose Columns\" in Meds & Orders section of the refill encounter.            Passed - Medication is active on med list        Passed - Patient is age 18 or older        Passed - No active pregnancy on record        Passed - No positive pregnancy test in last 12 months          " Subjective: Patient seen and examined resting in bed.    Medications:  acetaminophen     Tablet .. 650 milliGRAM(s) Oral every 6 hours  cefepime   IVPB 1000 milliGRAM(s) IV Intermittent every 8 hours  chlorhexidine 2% Cloths 1 Application(s) Topical <User Schedule>  dextrose 50% Injectable 50 milliLiter(s) IV Push every 15 minutes  furosemide   Injectable 40 milliGRAM(s) IV Push two times a day  gabapentin 100 milliGRAM(s) Oral two times a day  heparin   Injectable 5000 Unit(s) SubCutaneous every 8 hours  hydrALAZINE 10 milliGRAM(s) Oral every 8 hours  insulin regular Infusion 3 Unit(s)/Hr IV Continuous <Continuous>  methylPREDNISolone sodium succinate Injectable 32 milliGRAM(s) IV Push every 12 hours  milrinone Infusion 0.1 MICROgram(s)/kG/Min IV Continuous <Continuous>  mycophenolate mofetil 1000 milliGRAM(s) Oral every 12 hours  pantoprazole    Tablet 40 milliGRAM(s) Oral before breakfast  polyethylene glycol 3350 17 Gram(s) Oral daily  sodium chloride 0.9%. 1000 milliLiter(s) IV Continuous <Continuous>  tacrolimus 4 milliGRAM(s) Oral <User Schedule>  vancomycin    Solution 125 milliGRAM(s) Oral every 12 hours  vancomycin  IVPB 750 milliGRAM(s) IV Intermittent every 12 hours      ICU Vital Signs Last 24 Hrs  T(C): 36 (2022 04:00), Max: 36.2 (2022 12:00)  T(F): 96.8 (2022 04:00), Max: 97.2 (2022 12:00)  HR: 93 (2022 07:00) (92 - 104)  BP: 111/70 (2022 06:00) (107/67 - 129/74)  BP(mean): 85 (2022 06:00) (81 - 95)  ABP: 128/72 (2022 07:00) (81/74 - 165/75)  ABP(mean): 92 (2022 07:00) (65 - 99)  RR: 20 (2022 07:00) (16 - 32)  SpO2: 98% (2022 07:00) (91% - 100%)      Weight in k ( @ 18:00)    I&O's Summary    2022 07:01  -  2022 07:00  --------------------------------------------------------  IN: 774.4 mL / OUT: 2315 mL / NET: -1540.6 mL        Physical Exam  General: No distress.   Neck: JVP ~12-14 cm  Chest: Clear to auscultation bilaterally, +CT x 3  CV: Normal S1 and S2. No murmurs, rub, or gallops. Radial pulses normal.  Abdomen: Soft, non-distended, non-tender  Neurology: Alert and oriented times three.       Labs:                        10.5   14.42 )-----------( 235      ( 2022 23:56 )             30.8         136  |  100  |  58<H>  ----------------------------<  117<H>  4.5   |  24  |  2.32<H>    Ca    9.4      2022 23:56  Phos  2.6       Mg     2.0         TPro  6.1  /  Alb  3.9  /  TBili  0.7  /  DBili  x   /  AST  16  /  ALT  27  /  AlkPhos  49              Oxygen Saturation, Mixed: 67.7 ( @ 10:52)  Oxygen Saturation, Mixed: 75.5 ( @ 03:33)  Oxygen Saturation, Mixed: 80.9 ( @ 00:34)  Oxygen Saturation, Mixed: 75.5 ( @ 19:02)  Oxygen Saturation, Mixed: 75.7 ( @ 16:06)  Oxygen Saturation, Mixed: 76.5 ( @ 12:43)            Imaging Studies

## 2022-09-03 ENCOUNTER — HEALTH MAINTENANCE LETTER (OUTPATIENT)
Age: 57
End: 2022-09-03

## 2022-10-20 DIAGNOSIS — F32.0 MILD MAJOR DEPRESSION (H): ICD-10-CM

## 2022-10-20 DIAGNOSIS — F41.1 GAD (GENERALIZED ANXIETY DISORDER): ICD-10-CM

## 2022-10-21 RX ORDER — ESCITALOPRAM OXALATE 20 MG/1
TABLET ORAL
Qty: 90 TABLET | Refills: 1 | Status: SHIPPED | OUTPATIENT
Start: 2022-10-21 | End: 2023-04-12

## 2022-10-21 NOTE — TELEPHONE ENCOUNTER
Routing refill request to provider for review/approval because:  Patient needs to be seen because it has been more than 1 year since last office visit.  PHQ9 overdue    Last Written Prescription Date:  10/12/21  Last Fill Quantity: 90,  # refills: 3   Last office visit: virtual 10/12/21 with House  Future Office Visit:  4/12/23 with House    No MyChart; unable to send PHQ9.    BEBO Gonzalez RN  [facility]

## 2022-11-28 DIAGNOSIS — F41.1 GAD (GENERALIZED ANXIETY DISORDER): ICD-10-CM

## 2022-11-30 RX ORDER — BUSPIRONE HYDROCHLORIDE 10 MG/1
TABLET ORAL
Qty: 180 TABLET | Refills: 1 | Status: SHIPPED | OUTPATIENT
Start: 2022-11-30 | End: 2023-04-12 | Stop reason: DRUGHIGH

## 2022-11-30 NOTE — TELEPHONE ENCOUNTER
Medication is being filled for 1 time refill only due to:  Patient needs to be seen because due for annual visit.    Last Written Prescription Date:  10/12/2021  Last Fill Quantity: 180,  # refills: 3   Last office visit: Visit date not found with prescribing provider:  House   [facility] Office Visit:  4/12/2023    TENA GarzaN RN  [facility]

## 2023-04-12 ENCOUNTER — OFFICE VISIT (OUTPATIENT)
Dept: FAMILY MEDICINE | Facility: CLINIC | Age: 58
End: 2023-04-12
Payer: COMMERCIAL

## 2023-04-12 VITALS
HEART RATE: 73 BPM | WEIGHT: 160 LBS | RESPIRATION RATE: 14 BRPM | OXYGEN SATURATION: 99 % | BODY MASS INDEX: 22.9 KG/M2 | SYSTOLIC BLOOD PRESSURE: 133 MMHG | TEMPERATURE: 97.6 F | HEIGHT: 70 IN | DIASTOLIC BLOOD PRESSURE: 93 MMHG

## 2023-04-12 DIAGNOSIS — Z12.31 VISIT FOR SCREENING MAMMOGRAM: ICD-10-CM

## 2023-04-12 DIAGNOSIS — Z11.4 SCREENING FOR HIV (HUMAN IMMUNODEFICIENCY VIRUS): ICD-10-CM

## 2023-04-12 DIAGNOSIS — Z00.00 ROUTINE GENERAL MEDICAL EXAMINATION AT A HEALTH CARE FACILITY: Primary | ICD-10-CM

## 2023-04-12 DIAGNOSIS — Z12.11 COLON CANCER SCREENING: ICD-10-CM

## 2023-04-12 DIAGNOSIS — Z12.83 SKIN CANCER SCREENING: ICD-10-CM

## 2023-04-12 DIAGNOSIS — Z13.1 SCREENING FOR DIABETES MELLITUS: ICD-10-CM

## 2023-04-12 DIAGNOSIS — F41.1 GAD (GENERALIZED ANXIETY DISORDER): ICD-10-CM

## 2023-04-12 DIAGNOSIS — Z13.220 LIPID SCREENING: ICD-10-CM

## 2023-04-12 DIAGNOSIS — R21 RASH: ICD-10-CM

## 2023-04-12 DIAGNOSIS — N39.3 FEMALE STRESS INCONTINENCE: ICD-10-CM

## 2023-04-12 DIAGNOSIS — F32.0 MILD MAJOR DEPRESSION (H): ICD-10-CM

## 2023-04-12 DIAGNOSIS — Z12.31 SCREENING MAMMOGRAM, ENCOUNTER FOR: ICD-10-CM

## 2023-04-12 DIAGNOSIS — Z78.9 VEGETARIAN DIET: ICD-10-CM

## 2023-04-12 DIAGNOSIS — N89.8 VAGINAL DRYNESS: ICD-10-CM

## 2023-04-12 PROCEDURE — 99396 PREV VISIT EST AGE 40-64: CPT | Performed by: FAMILY MEDICINE

## 2023-04-12 PROCEDURE — 99214 OFFICE O/P EST MOD 30 MIN: CPT | Mod: 25 | Performed by: FAMILY MEDICINE

## 2023-04-12 PROCEDURE — 87624 HPV HI-RISK TYP POOLED RSLT: CPT | Performed by: FAMILY MEDICINE

## 2023-04-12 PROCEDURE — G0145 SCR C/V CYTO,THINLAYER,RESCR: HCPCS | Performed by: FAMILY MEDICINE

## 2023-04-12 RX ORDER — ESTRADIOL 10 UG/1
10 INSERT VAGINAL
Qty: 24 TABLET | Refills: 3 | Status: SHIPPED | OUTPATIENT
Start: 2023-04-13

## 2023-04-12 RX ORDER — ESCITALOPRAM OXALATE 20 MG/1
20 TABLET ORAL DAILY
Qty: 90 TABLET | Refills: 3 | Status: SHIPPED | OUTPATIENT
Start: 2023-04-12 | End: 2024-04-25

## 2023-04-12 RX ORDER — TRIAMCINOLONE ACETONIDE 1 MG/G
CREAM TOPICAL 2 TIMES DAILY
Qty: 30 G | Refills: 1 | Status: SHIPPED | OUTPATIENT
Start: 2023-04-12

## 2023-04-12 RX ORDER — BUSPIRONE HYDROCHLORIDE 15 MG/1
15 TABLET ORAL 2 TIMES DAILY
Qty: 180 TABLET | Refills: 3 | Status: SHIPPED | OUTPATIENT
Start: 2023-04-12 | End: 2024-04-12

## 2023-04-12 RX ORDER — BUSPIRONE HYDROCHLORIDE 10 MG/1
10 TABLET ORAL 2 TIMES DAILY
Qty: 180 TABLET | Refills: 3 | Status: CANCELLED | OUTPATIENT
Start: 2023-04-12

## 2023-04-12 RX ORDER — ESTRADIOL 10 UG/1
10 INSERT VAGINAL DAILY
Qty: 14 TABLET | Refills: 0 | Status: SHIPPED | OUTPATIENT
Start: 2023-04-12 | End: 2023-04-26

## 2023-04-12 ASSESSMENT — PATIENT HEALTH QUESTIONNAIRE - PHQ9
SUM OF ALL RESPONSES TO PHQ QUESTIONS 1-9: 3
SUM OF ALL RESPONSES TO PHQ QUESTIONS 1-9: 3
10. IF YOU CHECKED OFF ANY PROBLEMS, HOW DIFFICULT HAVE THESE PROBLEMS MADE IT FOR YOU TO DO YOUR WORK, TAKE CARE OF THINGS AT HOME, OR GET ALONG WITH OTHER PEOPLE: SOMEWHAT DIFFICULT

## 2023-04-12 ASSESSMENT — ENCOUNTER SYMPTOMS
DIARRHEA: 0
JOINT SWELLING: 0
SHORTNESS OF BREATH: 0
CONSTIPATION: 0
DIZZINESS: 0
DYSURIA: 0
CHILLS: 0
FREQUENCY: 0
PALPITATIONS: 0
ABDOMINAL PAIN: 0
HEMATURIA: 0
BREAST MASS: 0
FEVER: 0
EYE PAIN: 0
NERVOUS/ANXIOUS: 1
HEMATOCHEZIA: 0
WEAKNESS: 1
PARESTHESIAS: 1
HEADACHES: 1
NAUSEA: 0
HEARTBURN: 1
ARTHRALGIAS: 0
SORE THROAT: 0
MYALGIAS: 0
COUGH: 1

## 2023-04-12 ASSESSMENT — ANXIETY QUESTIONNAIRES
7. FEELING AFRAID AS IF SOMETHING AWFUL MIGHT HAPPEN: NOT AT ALL
IF YOU CHECKED OFF ANY PROBLEMS ON THIS QUESTIONNAIRE, HOW DIFFICULT HAVE THESE PROBLEMS MADE IT FOR YOU TO DO YOUR WORK, TAKE CARE OF THINGS AT HOME, OR GET ALONG WITH OTHER PEOPLE: SOMEWHAT DIFFICULT
2. NOT BEING ABLE TO STOP OR CONTROL WORRYING: SEVERAL DAYS
GAD7 TOTAL SCORE: 10
GAD7 TOTAL SCORE: 10
5. BEING SO RESTLESS THAT IT IS HARD TO SIT STILL: NOT AT ALL
8. IF YOU CHECKED OFF ANY PROBLEMS, HOW DIFFICULT HAVE THESE MADE IT FOR YOU TO DO YOUR WORK, TAKE CARE OF THINGS AT HOME, OR GET ALONG WITH OTHER PEOPLE?: SOMEWHAT DIFFICULT
1. FEELING NERVOUS, ANXIOUS, OR ON EDGE: NEARLY EVERY DAY
GAD7 TOTAL SCORE: 10
7. FEELING AFRAID AS IF SOMETHING AWFUL MIGHT HAPPEN: NOT AT ALL
6. BECOMING EASILY ANNOYED OR IRRITABLE: NOT AT ALL
4. TROUBLE RELAXING: NEARLY EVERY DAY
3. WORRYING TOO MUCH ABOUT DIFFERENT THINGS: NEARLY EVERY DAY

## 2023-04-12 ASSESSMENT — PAIN SCALES - GENERAL: PAINLEVEL: NO PAIN (0)

## 2023-04-12 NOTE — PROGRESS NOTES
SUBJECTIVE:   CC: Rehana is an 58 year old who presents for preventive health visit.       4/12/2023     8:24 AM   Additional Questions   Roomed by carmen guido   Accompanied by none         4/12/2023     8:24 AM   Patient Reported Additional Medications   Patient reports taking the following new medications none      Patient has been advised of split billing requirements and indicates understanding: Yes  Healthy Habits:     Getting at least 3 servings of Calcium per day:  NO    Bi-annual eye exam:  Yes    Dental care twice a year:  NO    Sleep apnea or symptoms of sleep apnea:  None    Diet:  Vegetarian/vegan    Frequency of exercise:  6-7 days/week    Duration of exercise:  30-45 minutes    Taking medications regularly:  Yes    Medication side effects:  None    PHQ-2 Total Score: 1    Additional concerns today:  No  Answers for HPI/ROS submitted by the patient on 4/12/2023  If you checked off any problems, how difficult have these problems made it for you to do your work, take care of things at home, or get along with other people?: Somewhat difficult  PHQ9 TOTAL SCORE: 3  JHONY 7 TOTAL SCORE: 10    Has been more anxious since COVID. Went to  for palpitations and worry about COVID.         8/17/2021     7:58 AM 10/12/2021    11:15 AM 4/12/2023     8:24 AM   JHONY-7 SCORE   Total Score   10 (moderate anxiety)   Total Score 5 5 10              4/12/2023     8:11 AM   PHQ   PHQ-9 Total Score 3   Q9: Thoughts of better off dead/self-harm past 2 weeks Not at all       Today's PHQ-2 Score:       4/12/2023     8:14 AM   PHQ-2 ( 1999 [employer])   Q1: Little interest or pleasure in doing things 0   Q2: Feeling down, depressed or hopeless 1   PHQ-2 Score 1   Q1: Little interest or pleasure in doing things Not at all   Q2: Feeling down, depressed or hopeless Several days   PHQ-2 Score 1       Have you ever done Advance Care Planning? (For example, a Health Directive, POLST, or a discussion with a medical provider or your loved  ones about your wishes): No, advance care planning information given to patient to review.  Advanced care planning was discussed at today's visit.    Social History     Tobacco Use     Smoking status: Never     Passive exposure: Never     Smokeless tobacco: Never   Vaping Use     Vaping status: Never Used   Substance Use Topics     Alcohol use: Yes     Comment: 1 beers per day           4/12/2023     8:14 AM   Alcohol Use   Prescreen: >3 drinks/day or >7 drinks/week? No     Reviewed orders with patient.  Reviewed health maintenance and updated orders accordingly - Yes       Breast Cancer Screening:    FHS-7:       4/12/2023     8:15 AM   Breast CA Risk Assessment (FHS-7)   Did any of your first-degree relatives have breast or ovarian cancer? No   Did any of your relatives have bilateral breast cancer? No   Did any man in your family have breast cancer? No   Did any woman in your family have breast and ovarian cancer? Yes   Did any woman in your family have breast cancer before age 50 y? No   Do you have 2 or more relatives with breast and/or ovarian cancer? No   Do you have 2 or more relatives with breast and/or bowel cancer? No          History of abnormal Pap smear: NO - age 30-65 PAP every 5 years with negative HPV co-testing recommended      Latest Ref Rng & Units 4/12/2023     9:17 AM 1/24/2017     1:52 PM 1/24/2017     1:16 PM   PAP / HPV   PAP  Unsatisfactory for evaluation       PAP (Historical)    NIL     HPV 16 DNA NEG  Negative      HPV 18 DNA NEG  Negative      Other HR HPV NEG  Negative        Reviewed and updated as needed this visit by clinical staff   Tobacco  Allergies  Meds              Reviewed and updated as needed this visit by Provider                 Past Medical History:   Diagnosis Date     Benign heart murmur     checked in 2006, told benign     CARDIOVASCULAR SCREENING; LDL GOAL LESS THAN 160 8/9/2011     Mild major depression (H) 8/9/2011      Past Surgical History:   Procedure  "Laterality Date     no surgeries       OB History    Para Term  AB Living   2 0 0 0 2 0   SAB IAB Ectopic Multiple Live Births   0 0 0 0 0      # Outcome Date GA Lbr Maxime/2nd Weight Sex Delivery Anes PTL Lv   2 AB            1 AB                Review of Systems   Constitutional: Negative for chills and fever.   HENT: Positive for congestion. Negative for ear pain, hearing loss and sore throat.    Eyes: Negative for pain and visual disturbance.   Respiratory: Positive for cough. Negative for shortness of breath.    Cardiovascular: Negative for chest pain, palpitations and peripheral edema.   Gastrointestinal: Positive for heartburn. Negative for abdominal pain, constipation, diarrhea, hematochezia and nausea.   Breasts:  Negative for tenderness, breast mass and discharge.   Genitourinary: Negative for dysuria, frequency, genital sores, hematuria, pelvic pain, urgency, vaginal bleeding and vaginal discharge.   Musculoskeletal: Negative for arthralgias, joint swelling and myalgias.   Skin: Negative for rash.   Neurological: Positive for weakness, headaches and paresthesias. Negative for dizziness.   Psychiatric/Behavioral: Positive for mood changes. The patient is nervous/anxious.      Her friend was diagnosed with a stage 4 cancer. She worries about her health.   Vaginal dryness. No discharge, itch. Sex is uncomfortable due to the dryness.   Notes some stress incontinence. Denies any urge symptoms. Denies dysuria. No new symptoms.         OBJECTIVE:   BP (!) 133/93 (BP Location: Right arm, Patient Position: Chair, Cuff Size: Adult Regular)   Pulse 73   Temp 97.6  F (36.4  C) (Temporal)   Resp 14   Ht 1.778 m (5' 10\")   Wt 72.6 kg (160 lb)   LMP  (LMP Unknown)   SpO2 99%   BMI 22.96 kg/m       BP Readings from Last 6 Encounters:   23 (!) 133/93   22 (!) 142/91   19 138/84   18 122/85   17 122/84   17 138/86     Physical Exam  GENERAL APPEARANCE: healthy, alert " and no distress  EYES: Eyes grossly normal to inspection, PERRL and conjunctivae and sclerae normal  HENT: ear canals and TM's normal, nose and mouth without ulcers or lesions, oropharynx clear and oral mucous membranes moist  NECK: no adenopathy, no asymmetry, masses, or scars and thyroid normal to palpation  RESP: lungs clear to auscultation - no rales, rhonchi or wheezes  BREAST: normal without masses, tenderness or nipple discharge and no palpable axillary masses or adenopathy  CV: regular rate and rhythm, normal S1 S2, no S3 or S4, no murmur, click or rub, no peripheral edema and peripheral pulses strong  ABDOMEN: soft, nontender, no hepatosplenomegaly, no masses and bowel sounds normal   (female): normal female external genitalia, normal urethral meatus, vaginal mucosal atrophy noted, normal cervix, adnexae, and uterus without masses or abnormal discharge  MS: no musculoskeletal defects are noted and gait is age appropriate without ataxia  SKIN: no suspicious lesions or rashes  NEURO: Normal strength and tone, sensory exam grossly normal, mentation intact and speech normal  PSYCH: mentation appears normal and affect normal/bright    Diagnostic Test Results:  none     ASSESSMENT/PLAN:       ICD-10-CM    1. Routine general medical examination at a health care facility  Z00.00 Pap imaged thin layer screen with HPV - recommended age 30 - 65 years (select HPV order below)     HPV Hold (Lab Only)     HPV High Risk Types DNA Cervical      2. JHONY (generalized anxiety disorder)  F41.1 escitalopram (LEXAPRO) 20 MG tablet     busPIRone (BUSPAR) 15 MG tablet      3. Mild major depression (H)  F32.0 escitalopram (LEXAPRO) 20 MG tablet      4. Colon cancer screening  Z12.11       5. Visit for screening mammogram  Z12.31       6. Female stress incontinence  N39.3       7. Screening for HIV (human immunodeficiency virus)  Z11.4 HIV Antigen Antibody Combo      8. Lipid screening  Z13.220 Lipid panel reflex to direct LDL  Fasting      9. Screening for diabetes mellitus  Z13.1 Glucose      10. Screening mammogram, encounter for  Z12.31 MA Screen Bilateral w/Jose Manuel      11. Vegetarian diet  Z78.9 Iron and iron binding capacity     Ferritin     CBC with platelets      12. Rash  R21 triamcinolone (KENALOG) 0.1 % external cream      13. Vaginal dryness  N89.8 estradiol (VAGIFEM) 10 MCG TABS vaginal tablet     estradiol (VAGIFEM) 10 MCG TABS vaginal tablet      14. Skin cancer screening  Z12.83 Adult Dermatology Referral        Routine general medical examination at a health care facility  Healthy  Naeem completed 8/17/21, due again in 2024  Recommended shingles vaccine  She will return for fasting labs - lipids, glucose    HIV screening test recommended  Tdap and hep b vaccines recommended - she had to leave today to drive her  to work. Will come for labs and vaccines when able.   COVID vaccines are up to date   Pap with HPV cotesting completed today. LMP was years ago.   She will schedule a mammogram      JHONY (generalized anxiety disorder)   improved. Really feels the buspar is helping. Denies side effects.  Continues BuSpar tablet 10 mg twice daily.  Continue Lexapro 20 mg daily.     Improved since tapering wellbutrin. Has not started buspar. Yesterday was last dose of wellbutrin 150mg, so will start buspar today. Instructions for titration reviewed with her today. Follow up with me in two weeks virtually     Not well controlled. Will taper off bupropion to 150mg daily for a week, then stop. Then she will start buspar 5mg twice daily for 3 days, then 10mg am and 5mg pm for 3 days, then 10mg twice daily. She will follow up with me in a month. Discussed possible side effects. She will let me know if she has any concerns in the meantime.   - escitalopram (LEXAPRO) 20 MG tablet  Dispense: 90 tablet; Refill: 3  - hydrOXYzine (ATARAX) 25 MG tablet  Dispense: 60 tablet; Refill: 3  - busPIRone (BUSPAR) 5 MG tablet  Dispense: 120  tablet; Refill: 1     Mild major depression (H)  Well controlled. Continue lexapro 20mg daily.      Anxiety  Increased lately, higher stress, friend with stage 4 cancer. Continue lexapro 20mg daily  Increase buspar to 15mg twice daily.  Follow up with me in 2 months.      Vaginal atrophy  Very dry, sexual intercourse is uncomfortable. Denies itch, discharge, odor. Atrophic changes noted on exam today  Start vagifem 10mg insert vaginally daily for two weeks, then reduce frequency to twice weekly for maintenance. Discussed risks/side effects/benefits. Discussed possible vaginal estrogen cream for both vaginal and vulvar atrophy, however it did not appear that the estrogen cream was covered. If persistent symptoms, will consider trying to switch to cream to apply at the vulva and introitus as well.     Stress incontinence.   Discussed briefly today, plan on further discussion at follow up in two months.         Patient Instructions   1. Work on reducing the salt in your diet to help with your blood pressure.    2.  Consider getting the shingles vaccine at the pharmacy   3. Use a good lubricant for sex. Good Clean Love is an excellent option. Sliquid is another good brand.   4. Start the vaginal tablet once daily for two weeks then reduce frequency to twice weekly.    5. Schedule skin cancer screening with dermatology   6. See me in 2 months.       Preventive Health Recommendations  Female Ages 50 - 64    Yearly exam: See your health care provider every year in order to  o Review health changes.   o Discuss preventive care.    o Review your medicines if your doctor has prescribed any.      Get a Pap test every three years (unless you have an abnormal result and your provider advises testing more often).    If you get Pap tests with HPV test, you only need to test every 5 years, unless you have an abnormal result.     You do not need a Pap test if your uterus was removed (hysterectomy) and you have not had cancer.    You  should be tested each year for STDs (sexually transmitted diseases) if you're at risk.     Have a mammogram every 1 to 2 years.    Have a colonoscopy at age 50, or have a yearly FIT test (stool test). These exams screen for colon cancer.      Have a cholesterol test every 5 years, or more often if advised.    Have a diabetes test (fasting glucose) every three years. If you are at risk for diabetes, you should have this test more often.     If you are at risk for osteoporosis (brittle bone disease), think about having a bone density scan (DEXA).    Shots: Get a flu shot each year. Get a tetanus shot every 10 years.    Nutrition:     Eat at least 5 servings of fruits and vegetables each day.    Eat whole-grain bread, whole-wheat pasta and brown rice instead of white grains and rice.    Get adequate Calcium and Vitamin D.     Lifestyle    Exercise at least 150 minutes a week (30 minutes a day, 5 days a week). This will help you control your weight and prevent disease.    Limit alcohol to one drink per day.    No smoking.     Wear sunscreen to prevent skin cancer.     See your dentist every six months for an exam and cleaning.    See your eye doctor every 1 to 2 years.    For bone health:    1.  CALCIUM Recommendation 1200mg/day in divided doses of no more than 400-500 mg/dose. This includes both what is in your food AND what is in any supplements you are taking.  Read the labels carefully.    Some Dietary sources of calcium: These also contain vitamin D  Milk                            8 oz            300 mg  Yogurt                          1 cup           400 mg  Hard cheese                     1.5 oz          300 mg  Cottage cheese                  2 cup           300 mg  Orange juice with Calcium       8 oz            300 mg  Low fat dairy sources are recommended    2.  Adequate vitamin D    Recommended vitamin D intake  Over age 50: 800-1000 IU/day  Safe upper limit for vitamin D 4000 IU/day      Good dietary  sources of vitamin D:  Fatty fish  liver  Egg yolks  Vitamin D fortified milk, juices, cereals    3.  Bone health exercise  A) 30 minutes of moderate exercise on most days of the week   Weight bearing exercise (ie, walking, jogging, hiking, dancing)    B) Strength training 2 or more times/week in addition to other weight -being exercise.    Strength training  uses weight or resistance beyond that seen in everyday activities -(pilates, weight training with free weights, weight machines or resistance bands)    Weight supporting activities such as water aerobics, floor exercises and stationary cycling may be more appropriate for some patients with compromised bone health    Spinal fractures: AVOID activities that place significant force on the spine such as horse back riding,  tennis, golf or bowling    For OSTEOPOROSIS of the spine: avoid exercise machines that incorporate spinal flexion, trunk rotation, or forward bending   Specifically avoid abdominal exercisers, stationary bikes with moving handles, cross-country ski machines, rowing machines, and bicep curl machines        COUNSELING:  Reviewed preventive health counseling, as reflected in patient instructions        She reports that she has never smoked. She has never been exposed to tobacco smoke. She has never used smokeless tobacco.       Marcy Mclaughlin MD   [facility]

## 2023-04-12 NOTE — PATIENT INSTRUCTIONS
Work on reducing the salt in your diet to help with your blood pressure.    2.  Consider getting the shingles vaccine at the pharmacy   3. Use a good lubricant for sex. Good Clean Love is an excellent option. Sliquid is another good brand.   4. Start the vaginal tablet once daily for two weeks then reduce frequency to twice weekly.    5. Schedule skin cancer screening with dermatology   6. See me in 2 months.       Preventive Health Recommendations  Female Ages 50 - 64    Yearly exam: See your health care provider every year in order to  Review health changes.   Discuss preventive care.    Review your medicines if your doctor has prescribed any.    Get a Pap test every three years (unless you have an abnormal result and your provider advises testing more often).  If you get Pap tests with HPV test, you only need to test every 5 years, unless you have an abnormal result.   You do not need a Pap test if your uterus was removed (hysterectomy) and you have not had cancer.  You should be tested each year for STDs (sexually transmitted diseases) if you're at risk.   Have a mammogram every 1 to 2 years.  Have a colonoscopy at age 50, or have a yearly FIT test (stool test). These exams screen for colon cancer.    Have a cholesterol test every 5 years, or more often if advised.  Have a diabetes test (fasting glucose) every three years. If you are at risk for diabetes, you should have this test more often.   If you are at risk for osteoporosis (brittle bone disease), think about having a bone density scan (DEXA).    Shots: Get a flu shot each year. Get a tetanus shot every 10 years.    Nutrition:   Eat at least 5 servings of fruits and vegetables each day.  Eat whole-grain bread, whole-wheat pasta and brown rice instead of white grains and rice.  Get adequate Calcium and Vitamin D.     Lifestyle  Exercise at least 150 minutes a week (30 minutes a day, 5 days a week). This will help you control your weight and prevent  disease.  Limit alcohol to one drink per day.  No smoking.   Wear sunscreen to prevent skin cancer.   See your dentist every six months for an exam and cleaning.  See your eye doctor every 1 to 2 years.    For bone health:    1.  CALCIUM Recommendation 1200mg/day in divided doses of no more than 400-500 mg/dose. This includes both what is in your food AND what is in any supplements you are taking.  Read the labels carefully.    Some Dietary sources of calcium: These also contain vitamin D  Milk                            8 oz            300 mg  Yogurt                          1 cup           400 mg  Hard cheese                     1.5 oz          300 mg  Cottage cheese                  2 cup           300 mg  Orange juice with Calcium       8 oz            300 mg  Low fat dairy sources are recommended    2.  Adequate vitamin D    Recommended vitamin D intake  Over age 50: 800-1000 IU/day  Safe upper limit for vitamin D 4000 IU/day      Good dietary sources of vitamin D:  Fatty fish  liver  Egg yolks  Vitamin D fortified milk, juices, cereals    3.  Bone health exercise  A) 30 minutes of moderate exercise on most days of the week   Weight bearing exercise (ie, walking, jogging, hiking, dancing)    B) Strength training 2 or more times/week in addition to other weight -being exercise.    Strength training  uses weight or resistance beyond that seen in everyday activities -(pilates, weight training with free weights, weight machines or resistance bands)    Weight supporting activities such as water aerobics, floor exercises and stationary cycling may be more appropriate for some patients with compromised bone health    Spinal fractures: AVOID activities that place significant force on the spine such as horse back riding,  tennis, golf or bowling    For OSTEOPOROSIS of the spine: avoid exercise machines that incorporate spinal flexion, trunk rotation, or forward bending   Specifically avoid abdominal exercisers,  stationary bikes with moving handles, cross-country ski machines, rowing machines, and bicep curl machines

## 2023-04-15 ENCOUNTER — LAB (OUTPATIENT)
Dept: LAB | Facility: CLINIC | Age: 58
End: 2023-04-15
Payer: COMMERCIAL

## 2023-04-15 DIAGNOSIS — Z78.9 VEGETARIAN DIET: ICD-10-CM

## 2023-04-15 DIAGNOSIS — Z13.1 SCREENING FOR DIABETES MELLITUS: ICD-10-CM

## 2023-04-15 DIAGNOSIS — Z11.4 SCREENING FOR HIV (HUMAN IMMUNODEFICIENCY VIRUS): ICD-10-CM

## 2023-04-15 DIAGNOSIS — Z13.220 LIPID SCREENING: ICD-10-CM

## 2023-04-15 LAB
CHOLEST SERPL-MCNC: 176 MG/DL
ERYTHROCYTE [DISTWIDTH] IN BLOOD BY AUTOMATED COUNT: 11.8 % (ref 10–15)
FASTING STATUS PATIENT QL REPORTED: YES
FERRITIN SERPL-MCNC: 38 NG/ML (ref 11–328)
GLUCOSE SERPL-MCNC: 91 MG/DL (ref 70–99)
HCT VFR BLD AUTO: 38.2 % (ref 35–47)
HDLC SERPL-MCNC: 76 MG/DL
HGB BLD-MCNC: 12.5 G/DL (ref 11.7–15.7)
IRON BINDING CAPACITY (ROCHE): 262 UG/DL (ref 240–430)
IRON SATN MFR SERPL: 22 % (ref 15–46)
IRON SERPL-MCNC: 57 UG/DL (ref 37–145)
LDLC SERPL CALC-MCNC: 94 MG/DL
MCH RBC QN AUTO: 30.2 PG (ref 26.5–33)
MCHC RBC AUTO-ENTMCNC: 32.7 G/DL (ref 31.5–36.5)
MCV RBC AUTO: 92 FL (ref 78–100)
NONHDLC SERPL-MCNC: 100 MG/DL
PLATELET # BLD AUTO: 182 10E3/UL (ref 150–450)
RBC # BLD AUTO: 4.14 10E6/UL (ref 3.8–5.2)
TRIGL SERPL-MCNC: 32 MG/DL
WBC # BLD AUTO: 3.6 10E3/UL (ref 4–11)

## 2023-04-15 PROCEDURE — 82947 ASSAY GLUCOSE BLOOD QUANT: CPT

## 2023-04-15 PROCEDURE — 82728 ASSAY OF FERRITIN: CPT

## 2023-04-15 PROCEDURE — 83540 ASSAY OF IRON: CPT

## 2023-04-15 PROCEDURE — 80061 LIPID PANEL: CPT

## 2023-04-15 PROCEDURE — 83550 IRON BINDING TEST: CPT

## 2023-04-15 PROCEDURE — 85027 COMPLETE CBC AUTOMATED: CPT

## 2023-04-15 PROCEDURE — 87389 HIV-1 AG W/HIV-1&-2 AB AG IA: CPT

## 2023-04-15 PROCEDURE — 36415 COLL VENOUS BLD VENIPUNCTURE: CPT

## 2023-04-17 LAB
BKR LAB AP GYN ADEQUACY: NORMAL
BKR LAB AP GYN INTERPRETATION: NORMAL
BKR LAB AP HPV REFLEX: NORMAL
BKR LAB AP PREVIOUS ABNORMAL: NORMAL
HIV 1+2 AB+HIV1 P24 AG SERPL QL IA: NONREACTIVE
PATH REPORT.COMMENTS IMP SPEC: NORMAL
PATH REPORT.COMMENTS IMP SPEC: NORMAL
PATH REPORT.RELEVANT HX SPEC: NORMAL

## 2023-04-19 LAB
HUMAN PAPILLOMA VIRUS 16 DNA: NEGATIVE
HUMAN PAPILLOMA VIRUS 18 DNA: NEGATIVE
HUMAN PAPILLOMA VIRUS FINAL DIAGNOSIS: NORMAL
HUMAN PAPILLOMA VIRUS OTHER HR: NEGATIVE

## 2023-04-20 ENCOUNTER — PATIENT OUTREACH (OUTPATIENT)
Dept: FAMILY MEDICINE | Facility: CLINIC | Age: 58
End: 2023-04-20
Payer: COMMERCIAL

## 2023-04-20 PROBLEM — R87.615 UNSATISFACTORY CERVICAL PAPANICOLAOU SMEAR: Status: ACTIVE | Noted: 2023-04-12

## 2023-04-20 NOTE — TELEPHONE ENCOUNTER
4/12/23 Unsatisfactory pap due to scant cellularity, Neg HPV. Plan repeat co-test after 2- 4months

## 2023-07-25 ENCOUNTER — PATIENT OUTREACH (OUTPATIENT)
Dept: FAMILY MEDICINE | Facility: CLINIC | Age: 58
End: 2023-07-25
Payer: COMMERCIAL

## 2023-07-25 DIAGNOSIS — R87.615 UNSATISFACTORY CERVICAL PAPANICOLAOU SMEAR: Primary | ICD-10-CM

## 2023-07-25 NOTE — LETTER
July 25, 2023      Rehana Llamas  [address] [facility] [address]        Dear Ms.Anthony Llamas,    This letter is to remind you that you are due for your follow-up Pap smear and Human Papillomavirus (HPV) test.    Please call [phone] to schedule your appointment at your earliest convenience.    If you have completed the appointment outside of the [facility] system, please have the records forwarded to our office. We will update your chart for your provider to review before your next annual wellness visit.     Thank you for choosing [facility]!      Sincerely,    Your [facility] Care Team

## 2023-09-21 NOTE — TELEPHONE ENCOUNTER
FYI to provider - Patient is lost to pap tracking follow-up. Attempts to contact pt have been made per reminder process and there has been no reply and/or no appt scheduled. Contact hx listed below.     4/12/23 Unsatisfactory pap due to scant cellularity, Neg HPV. Plan repeat co-test after 2- 4months  4/21/23 Left message to call back  4/25/23 Pt notified  7/25/23 Reminder letter  08/24/23 Reminder call-lm  9/21/23 Lost to follow-up for pap tracking

## 2024-04-12 DIAGNOSIS — F41.1 GAD (GENERALIZED ANXIETY DISORDER): ICD-10-CM

## 2024-04-12 RX ORDER — BUSPIRONE HYDROCHLORIDE 15 MG/1
15 TABLET ORAL 2 TIMES DAILY
Qty: 60 TABLET | Refills: 0 | Status: SHIPPED | OUTPATIENT
Start: 2024-04-12 | End: 2024-05-15

## 2024-04-25 DIAGNOSIS — F41.1 GAD (GENERALIZED ANXIETY DISORDER): ICD-10-CM

## 2024-04-25 DIAGNOSIS — F32.0 MILD MAJOR DEPRESSION (H): ICD-10-CM

## 2024-04-25 RX ORDER — ESCITALOPRAM OXALATE 20 MG/1
20 TABLET ORAL DAILY
Qty: 90 TABLET | Refills: 3 | Status: SHIPPED | OUTPATIENT
Start: 2024-04-25 | End: 2024-07-25

## 2024-05-08 ENCOUNTER — ANCILLARY PROCEDURE (OUTPATIENT)
Dept: MAMMOGRAPHY | Facility: CLINIC | Age: 59
End: 2024-05-08
Attending: FAMILY MEDICINE
Payer: COMMERCIAL

## 2024-05-08 DIAGNOSIS — Z12.31 VISIT FOR SCREENING MAMMOGRAM: ICD-10-CM

## 2024-05-08 PROCEDURE — 77067 SCR MAMMO BI INCL CAD: CPT | Mod: TC | Performed by: RADIOLOGY

## 2024-05-15 DIAGNOSIS — F41.1 GAD (GENERALIZED ANXIETY DISORDER): ICD-10-CM

## 2024-05-15 RX ORDER — BUSPIRONE HYDROCHLORIDE 15 MG/1
15 TABLET ORAL 2 TIMES DAILY
Qty: 60 TABLET | Refills: 0 | Status: SHIPPED | OUTPATIENT
Start: 2024-05-15 | End: 2024-06-17

## 2024-06-14 DIAGNOSIS — F41.1 GAD (GENERALIZED ANXIETY DISORDER): ICD-10-CM

## 2024-06-17 RX ORDER — BUSPIRONE HYDROCHLORIDE 15 MG/1
15 TABLET ORAL 2 TIMES DAILY
Qty: 60 TABLET | Refills: 0 | Status: SHIPPED | OUTPATIENT
Start: 2024-06-17 | End: 2024-07-15

## 2024-06-17 NOTE — TELEPHONE ENCOUNTER
Patient is due for an appointment.   I sent a silvia refill for 90 days.   Reception: Please call to schedule patient for an appointment.   Thank you!!   AH

## 2024-07-15 DIAGNOSIS — F41.1 GAD (GENERALIZED ANXIETY DISORDER): ICD-10-CM

## 2024-07-15 RX ORDER — BUSPIRONE HYDROCHLORIDE 15 MG/1
15 TABLET ORAL 2 TIMES DAILY
Qty: 60 TABLET | Refills: 0 | Status: SHIPPED | OUTPATIENT
Start: 2024-07-15 | End: 2024-07-25

## 2024-07-25 ENCOUNTER — OFFICE VISIT (OUTPATIENT)
Dept: FAMILY MEDICINE | Facility: CLINIC | Age: 59
End: 2024-07-25
Payer: COMMERCIAL

## 2024-07-25 ENCOUNTER — ORDERS ONLY (AUTO-RELEASED) (OUTPATIENT)
Dept: FAMILY MEDICINE | Facility: CLINIC | Age: 59
End: 2024-07-25

## 2024-07-25 VITALS
BODY MASS INDEX: 24.08 KG/M2 | RESPIRATION RATE: 18 BRPM | HEART RATE: 59 BPM | HEIGHT: 68 IN | DIASTOLIC BLOOD PRESSURE: 82 MMHG | TEMPERATURE: 97.4 F | OXYGEN SATURATION: 98 % | SYSTOLIC BLOOD PRESSURE: 136 MMHG

## 2024-07-25 DIAGNOSIS — L30.9 ECZEMA, UNSPECIFIED TYPE: ICD-10-CM

## 2024-07-25 DIAGNOSIS — R87.615 UNSATISFACTORY CERVICAL PAPANICOLAOU SMEAR: ICD-10-CM

## 2024-07-25 DIAGNOSIS — N95.8 GENITOURINARY SYNDROME OF MENOPAUSE: ICD-10-CM

## 2024-07-25 DIAGNOSIS — Z78.0 POST-MENOPAUSAL: ICD-10-CM

## 2024-07-25 DIAGNOSIS — N89.8 VAGINAL DRYNESS: ICD-10-CM

## 2024-07-25 DIAGNOSIS — Z13.220 LIPID SCREENING: ICD-10-CM

## 2024-07-25 DIAGNOSIS — Z12.11 COLON CANCER SCREENING: ICD-10-CM

## 2024-07-25 DIAGNOSIS — F32.0 MILD MAJOR DEPRESSION (H): ICD-10-CM

## 2024-07-25 DIAGNOSIS — Z13.21 ENCOUNTER FOR VITAMIN DEFICIENCY SCREENING: ICD-10-CM

## 2024-07-25 DIAGNOSIS — Z13.29 SCREENING FOR THYROID DISORDER: ICD-10-CM

## 2024-07-25 DIAGNOSIS — Z00.00 ROUTINE GENERAL MEDICAL EXAMINATION AT A HEALTH CARE FACILITY: Primary | ICD-10-CM

## 2024-07-25 DIAGNOSIS — D72.819 LEUKOPENIA, UNSPECIFIED TYPE: ICD-10-CM

## 2024-07-25 DIAGNOSIS — F41.1 GAD (GENERALIZED ANXIETY DISORDER): ICD-10-CM

## 2024-07-25 DIAGNOSIS — N39.3 FEMALE STRESS INCONTINENCE: ICD-10-CM

## 2024-07-25 DIAGNOSIS — Z12.83 SKIN CANCER SCREENING: ICD-10-CM

## 2024-07-25 DIAGNOSIS — Z83.49 FAMILY HISTORY OF THYROID DISEASE: ICD-10-CM

## 2024-07-25 DIAGNOSIS — Z13.1 SCREENING FOR DIABETES MELLITUS: ICD-10-CM

## 2024-07-25 LAB
ALBUMIN SERPL BCG-MCNC: 4.1 G/DL (ref 3.5–5.2)
ALP SERPL-CCNC: 64 U/L (ref 40–150)
ALT SERPL W P-5'-P-CCNC: 18 U/L (ref 0–50)
ANION GAP SERPL CALCULATED.3IONS-SCNC: 9 MMOL/L (ref 7–15)
AST SERPL W P-5'-P-CCNC: 25 U/L (ref 0–45)
BASOPHILS # BLD AUTO: 0 10E3/UL (ref 0–0.2)
BASOPHILS NFR BLD AUTO: 1 %
BILIRUB SERPL-MCNC: 0.4 MG/DL
BUN SERPL-MCNC: 12.6 MG/DL (ref 8–23)
CALCIUM SERPL-MCNC: 9.3 MG/DL (ref 8.8–10.4)
CHLORIDE SERPL-SCNC: 105 MMOL/L (ref 98–107)
CHOLEST SERPL-MCNC: 166 MG/DL
CREAT SERPL-MCNC: 0.75 MG/DL (ref 0.51–0.95)
EGFRCR SERPLBLD CKD-EPI 2021: >90 ML/MIN/1.73M2
EOSINOPHIL # BLD AUTO: 0.1 10E3/UL (ref 0–0.7)
EOSINOPHIL NFR BLD AUTO: 3 %
ERYTHROCYTE [DISTWIDTH] IN BLOOD BY AUTOMATED COUNT: 11.8 % (ref 10–15)
FASTING STATUS PATIENT QL REPORTED: YES
FASTING STATUS PATIENT QL REPORTED: YES
GLUCOSE SERPL-MCNC: 97 MG/DL (ref 70–99)
HCO3 SERPL-SCNC: 28 MMOL/L (ref 22–29)
HCT VFR BLD AUTO: 39.8 % (ref 35–47)
HDLC SERPL-MCNC: 59 MG/DL
HGB BLD-MCNC: 13.1 G/DL (ref 11.7–15.7)
IMM GRANULOCYTES # BLD: 0 10E3/UL
IMM GRANULOCYTES NFR BLD: 0 %
LDLC SERPL CALC-MCNC: 94 MG/DL
LYMPHOCYTES # BLD AUTO: 1 10E3/UL (ref 0.8–5.3)
LYMPHOCYTES NFR BLD AUTO: 24 %
MCH RBC QN AUTO: 30.6 PG (ref 26.5–33)
MCHC RBC AUTO-ENTMCNC: 32.9 G/DL (ref 31.5–36.5)
MCV RBC AUTO: 93 FL (ref 78–100)
MONOCYTES # BLD AUTO: 0.5 10E3/UL (ref 0–1.3)
MONOCYTES NFR BLD AUTO: 11 %
NEUTROPHILS # BLD AUTO: 2.6 10E3/UL (ref 1.6–8.3)
NEUTROPHILS NFR BLD AUTO: 61 %
NONHDLC SERPL-MCNC: 107 MG/DL
PLATELET # BLD AUTO: 178 10E3/UL (ref 150–450)
POTASSIUM SERPL-SCNC: 4.2 MMOL/L (ref 3.4–5.3)
PROT SERPL-MCNC: 7.2 G/DL (ref 6.4–8.3)
RBC # BLD AUTO: 4.28 10E6/UL (ref 3.8–5.2)
SODIUM SERPL-SCNC: 142 MMOL/L (ref 135–145)
TRIGL SERPL-MCNC: 64 MG/DL
TSH SERPL DL<=0.005 MIU/L-ACNC: 1.07 UIU/ML (ref 0.3–4.2)
VIT D+METAB SERPL-MCNC: 18 NG/ML (ref 20–50)
WBC # BLD AUTO: 4.3 10E3/UL (ref 4–11)

## 2024-07-25 PROCEDURE — 87624 HPV HI-RISK TYP POOLED RSLT: CPT | Performed by: FAMILY MEDICINE

## 2024-07-25 PROCEDURE — 99396 PREV VISIT EST AGE 40-64: CPT | Performed by: FAMILY MEDICINE

## 2024-07-25 PROCEDURE — 80061 LIPID PANEL: CPT | Performed by: FAMILY MEDICINE

## 2024-07-25 PROCEDURE — 80053 COMPREHEN METABOLIC PANEL: CPT | Performed by: FAMILY MEDICINE

## 2024-07-25 PROCEDURE — 82306 VITAMIN D 25 HYDROXY: CPT | Performed by: FAMILY MEDICINE

## 2024-07-25 PROCEDURE — G0145 SCR C/V CYTO,THINLAYER,RESCR: HCPCS | Performed by: FAMILY MEDICINE

## 2024-07-25 PROCEDURE — 36415 COLL VENOUS BLD VENIPUNCTURE: CPT | Performed by: FAMILY MEDICINE

## 2024-07-25 PROCEDURE — 99214 OFFICE O/P EST MOD 30 MIN: CPT | Mod: 25 | Performed by: FAMILY MEDICINE

## 2024-07-25 PROCEDURE — 84443 ASSAY THYROID STIM HORMONE: CPT | Performed by: FAMILY MEDICINE

## 2024-07-25 PROCEDURE — 85025 COMPLETE CBC W/AUTO DIFF WBC: CPT | Performed by: FAMILY MEDICINE

## 2024-07-25 RX ORDER — ESCITALOPRAM OXALATE 20 MG/1
20 TABLET ORAL DAILY
Qty: 90 TABLET | Refills: 3 | Status: SHIPPED | OUTPATIENT
Start: 2024-07-25

## 2024-07-25 RX ORDER — ESTRADIOL 0.1 MG/G
CREAM VAGINAL
Qty: 42.5 G | Refills: 11 | Status: SHIPPED | OUTPATIENT
Start: 2024-07-25

## 2024-07-25 RX ORDER — BUSPIRONE HYDROCHLORIDE 15 MG/1
15 TABLET ORAL 2 TIMES DAILY
Qty: 180 TABLET | Refills: 3 | Status: SHIPPED | OUTPATIENT
Start: 2024-07-25

## 2024-07-25 RX ORDER — ESTRADIOL 10 UG/1
10 INSERT VAGINAL
Qty: 24 TABLET | Refills: 3 | Status: CANCELLED | OUTPATIENT
Start: 2024-07-25

## 2024-07-25 SDOH — HEALTH STABILITY: PHYSICAL HEALTH: ON AVERAGE, HOW MANY MINUTES DO YOU ENGAGE IN EXERCISE AT THIS LEVEL?: 40 MIN

## 2024-07-25 SDOH — HEALTH STABILITY: PHYSICAL HEALTH: ON AVERAGE, HOW MANY DAYS PER WEEK DO YOU ENGAGE IN MODERATE TO STRENUOUS EXERCISE (LIKE A BRISK WALK)?: 7 DAYS

## 2024-07-25 ASSESSMENT — ANXIETY QUESTIONNAIRES
GAD7 TOTAL SCORE: 7
1. FEELING NERVOUS, ANXIOUS, OR ON EDGE: SEVERAL DAYS
7. FEELING AFRAID AS IF SOMETHING AWFUL MIGHT HAPPEN: SEVERAL DAYS
GAD7 TOTAL SCORE: 7
6. BECOMING EASILY ANNOYED OR IRRITABLE: SEVERAL DAYS
GAD7 TOTAL SCORE: 7
3. WORRYING TOO MUCH ABOUT DIFFERENT THINGS: MORE THAN HALF THE DAYS
7. FEELING AFRAID AS IF SOMETHING AWFUL MIGHT HAPPEN: SEVERAL DAYS
4. TROUBLE RELAXING: SEVERAL DAYS
8. IF YOU CHECKED OFF ANY PROBLEMS, HOW DIFFICULT HAVE THESE MADE IT FOR YOU TO DO YOUR WORK, TAKE CARE OF THINGS AT HOME, OR GET ALONG WITH OTHER PEOPLE?: SOMEWHAT DIFFICULT
2. NOT BEING ABLE TO STOP OR CONTROL WORRYING: SEVERAL DAYS
IF YOU CHECKED OFF ANY PROBLEMS ON THIS QUESTIONNAIRE, HOW DIFFICULT HAVE THESE PROBLEMS MADE IT FOR YOU TO DO YOUR WORK, TAKE CARE OF THINGS AT HOME, OR GET ALONG WITH OTHER PEOPLE: SOMEWHAT DIFFICULT
5. BEING SO RESTLESS THAT IT IS HARD TO SIT STILL: NOT AT ALL

## 2024-07-25 ASSESSMENT — PAIN SCALES - GENERAL: PAINLEVEL: NO PAIN (0)

## 2024-07-25 ASSESSMENT — SOCIAL DETERMINANTS OF HEALTH (SDOH): HOW OFTEN DO YOU GET TOGETHER WITH FRIENDS OR RELATIVES?: TWICE A WEEK

## 2024-07-25 NOTE — PATIENT INSTRUCTIONS
Patient Education   Schedule a visit with your dentist - top priority. [facility] is an option for sliding fee care as well as the [facility] dental school clinic.    I recommend getting the shingles and RSV vaccine(s) at a local pharmacy.    Schedule a bone density test (DEXA scan), preferably at [facility] or [facility].    Start the vaginal estrogen cream: Insert 1g vaginally and apply small amount to the vaginal opening and vulva once nightly for two weeks, then twice weekly for maintenance.  Schedule skin cancer screening with [facility] [phone].    Preventive Care Advice   This is general advice given by our system to help you stay healthy. However, your care team may have specific advice just for you. Please talk to your care team about your preventive care needs.  Nutrition  Eat 5 or more servings of fruits and vegetables each day.  Try wheat bread, brown rice and whole grain pasta (instead of white bread, rice, and pasta).  Get enough calcium and vitamin D. Check the label on foods and aim for 100% of the RDA (recommended daily allowance).  Lifestyle  Exercise at least 150 minutes each week  (30 minutes a day, 5 days a week).  Do muscle strengthening activities 2 days a week. These help control your weight and prevent disease.  No smoking.  Wear sunscreen to prevent skin cancer.  Have a dental exam and cleaning every 6 months.  Yearly exams  See your health care team every year to talk about:  Any changes in your health.  Any medicines your care team has prescribed.  Preventive care, family planning, and ways to prevent chronic diseases.  Shots (vaccines)   HPV shots (up to age 26), if you've never had them before.  Hepatitis B shots (up to age 59), if you've never had them before.  COVID-19 shot: Get this shot when it's due.  Flu shot: Get a flu shot every year.  Tetanus shot: Get a tetanus shot every 10 years.  Pneumococcal, hepatitis  A, and RSV shots: Ask your care team if you need these based on your risk.  Shingles shot (for age 50 and up)  General health tests  Diabetes screening:  Starting at age 35, Get screened for diabetes at least every 3 years.  If you are younger than age 35, ask your care team if you should be screened for diabetes.  Cholesterol test: At age 39, start having a cholesterol test every 5 years, or more often if advised.  Bone density scan (DEXA): At age 50, ask your care team if you should have this scan for osteoporosis (brittle bones).  Hepatitis C: Get tested at least once in your life.  STIs (sexually transmitted infections)  Before age 24: Ask your care team if you should be screened for STIs.  After age 24: Get screened for STIs if you're at risk. You are at risk for STIs (including HIV) if:  You are sexually active with more than one person.  You don't use condoms every time.  You or a partner was diagnosed with a sexually transmitted infection.  If you are at risk for HIV, ask about PrEP medicine to prevent HIV.  Get tested for HIV at least once in your life, whether you are at risk for HIV or not.  Cancer screening tests  Cervical cancer screening: If you have a cervix, begin getting regular cervical cancer screening tests starting at age 21.  Breast cancer scan (mammogram): If you've ever had breasts, begin having regular mammograms starting at age 40. This is a scan to check for breast cancer.  Colon cancer screening: It is important to start screening for colon cancer at age 45.  Have a colonoscopy test every 10 years (or more often if you're at risk) Or, ask your provider about stool tests like a FIT test every year or Cologuard test every 3 years.  To learn more about your testing options, visit:   .  For help making a decision, visit:   https://bit.ly/hn55618.  Prostate cancer screening test: If you have a prostate, ask your care team if a prostate cancer screening test (PSA) at age 55 is right for  you.  Lung cancer screening: If you are a current or former smoker ages 50 to 80, ask your care team if ongoing lung cancer screenings are right for you.  For informational purposes only. Not to replace the advice of your health care provider. Copyright   2023 [facility]. All rights reserved. Clinically reviewed by the [facility] Transitions Program. NoWait [id number] - REV 01/24.  Substance Use Disorder: Care Instructions  Overview     You can improve your life and health by stopping your use of alcohol or drugs. When you don't drink or use drugs, you may feel and sleep better. You may get along better with your family, friends, and coworkers. There are medicines and programs that can help with substance use disorder.  How can you care for yourself at home?  Here are some ways to help you stay sober and prevent relapse.  If you have been given medicine to help keep you sober or reduce your cravings, be sure to take it exactly as prescribed.  Talk to your doctor about programs that can help you stop using drugs or drinking alcohol.  Do not keep alcohol or drugs in your home.  Plan ahead. Think about what you'll say if other people ask you to drink or use drugs. Try not to spend time with people who drink or use drugs.  Use the time and money spent on drinking or drugs to do something that's important to you.  Preventing a relapse  Have a plan to deal with relapse. Learn to recognize changes in your thinking that lead you to drink or use drugs. Get help before you start to drink or use drugs again.  Try to stay away from situations, friends, or places that may lead you to drink or use drugs.  If you feel the need to drink alcohol or use drugs again, seek help right away. Call a trusted friend or family member. Some people get support from organizations such as Narcotics Anonymous or [employer] or from treatment facilities.  If you relapse, get help as soon as you can. Some people make a plan  with another person that outlines what they want that person to do for them if they relapse. The plan usually includes how to handle the relapse and who to notify in case of relapse.  Don't give up. Remember that a relapse doesn't mean that you have failed. Use the experience to learn the triggers that lead you to drink or use drugs. Then quit again. Recovery is a lifelong process. Many people have several relapses before they are able to quit for good.  Follow-up care is a key part of your treatment and safety. Be sure to make and go to all appointments, and call your doctor if you are having problems. It's also a good idea to know your test results and keep a list of the medicines you take.  When should you call for help?   Call 911  anytime you think you may need emergency care. For example, call if you or someone else:    Has overdosed or has withdrawal signs. Be sure to tell the emergency workers that you are or someone else is using or trying to quit using drugs. Overdose or withdrawal signs may include:  Losing consciousness.  Seizure.  Seeing or hearing things that aren't there (hallucinations).     Is thinking or talking about suicide or harming others.   Where to get help 24 hours a day, 7 days a week   If you or someone you know talks about suicide, self-harm, a mental health crisis, a substance use crisis, or any other kind of emotional distress, get help right away. You can:    Call the Suicide and Crisis Lifeline at 984.     Call 7-140-741-TALK (1-700.196.3249).     Text HOME to 868132 to access the Crisis Text Line.   Consider saving these numbers in your phone.  Go to [email or web address] for more information or to chat online.  Call your doctor now or seek immediate medical care if:    You are having withdrawal symptoms. These may include nausea or vomiting, sweating, shakiness, and anxiety.   Watch closely for changes in your health, and be sure to contact your doctor if:    You have a relapse.     You  "need more help or support to stop.   Where can you learn more?  Go to https://www.health[facility].net/patiented  Enter H573 in the search box to learn more about \"Substance Use Disorder: Care Instructions.\"  Current as of: November 15, 2023               Content Version: 14.0    5929-3081 [employer].   Care instructions adapted under license by your healthcare professional. If you have questions about a medical condition or this instruction, always ask your healthcare professional. [employer], [employer] disclaims any warranty or liability for your use of this information.         "

## 2024-07-25 NOTE — PROGRESS NOTES
Preventive Care Visit  [facility]  Marcy Mclaughlin MD,  Family Medicine  Jul 25, 2024      Assessment & Plan       ICD-10-CM    1. Routine general medical examination at a health care facility  Z00.00       2. JHONY (generalized anxiety disorder)  F41.1 busPIRone (BUSPAR) 15 MG tablet     escitalopram (LEXAPRO) 20 MG tablet     Adult Mental Health  Referral     Comprehensive metabolic panel (BMP + Alb, Alk Phos, ALT, AST, Total. Bili, TP)     Comprehensive metabolic panel (BMP + Alb, Alk Phos, ALT, AST, Total. Bili, TP)      3. Mild major depression (H24)  F32.0 escitalopram (LEXAPRO) 20 MG tablet     Comprehensive metabolic panel (BMP + Alb, Alk Phos, ALT, AST, Total. Bili, TP)     Comprehensive metabolic panel (BMP + Alb, Alk Phos, ALT, AST, Total. Bili, TP)      4. Unsatisfactory cervical Papanicolaou smear  R87.615 Pap Screen with HPV - Recommended Age 30 - 65 Years      5. Female stress incontinence  N39.3       6. Colon cancer screening  Z12.11 COLOGUARD(EX[employer] SCIENCES)      7. Screening for diabetes mellitus  Z13.1 CANCELED: Glucose      8. Lipid screening  Z13.220 Lipid panel reflex to direct LDL Fasting     Lipid panel reflex to direct LDL Fasting      9. Vaginal dryness  N89.8 estradiol (ESTRACE) 0.1 MG/GM vaginal cream      10. Eczema, unspecified type  L30.9       11. Leukopenia, unspecified type  D72.819 CBC with platelets and differential     CBC with platelets and differential      12. Screening for thyroid disorder  Z13.29 TSH with free T4 reflex     TSH with free T4 reflex      13. Encounter for vitamin deficiency screening  Z13.21 Vitamin D Deficiency     Vitamin D Deficiency      14. Family history of thyroid disease  Z83.49 TSH with free T4 reflex     TSH with free T4 reflex      15. Skin cancer screening  Z12.83 Adult Dermatology  Referral      16. Post-menopausal  Z78.0 DX Bone Density      17. Genitourinary syndrome of menopause  N95.8           Routine general medical examination at a health care facility  Healthy  Naeem completed 8/17/21, due again in 2024  Immunizations: Recommended shingles and RSV vaccine  She will return for fasting labs - lipids, glucose were great last year, she would like to check lipids again.   Pap with HPV cotesting completed today. LMP was years ago.   Mammogram completed 5/24  DEXA scan discussed today and she will schedule     JHONY (generalized anxiety disorder)  Increased health anxiety this past year. Has a friend with stage 4 cancer. Hard to see friends' health decline. Continue buspar 15mg twice daily. Denies side effects.  Continues BuSpar tablet 10 mg twice daily.  Continue Lexapro 20 mg daily.  Referred for therapy. She had a helpful visit with Yoseph Arnold in the past and is interested in visiting with him again.   CMP today       Mild major depression (H)  Well controlled. Continue lexapro 20mg daily.        GSM  Vaginal atrophy  Stress incontinence  Very dry, sexual intercourse is uncomfortable. Denies itch, discharge, odor. Atrophic changes noted on exam today  Did not try vagifem.   Will start estradiol cream. Insert 1g vaginally and apply small amount to the vaginal opening and vulva once nightly for two weeks, then twice weekly for maintenance.     History of mild leukopenia  CBC with diff today     Family history of thyroid disease in her mom  TSH today    Vitamin D screening  Vitamin D level today     Eczema  Recent flare around her mouth. Her brother-in-law is a doctor and sent a prescription for triamcinolone and it cleared up.   Multiple family members are doctors and nurses.     Counseling  Appropriate preventive services were addressed with this patient via screening, questionnaire, or discussion as appropriate for fall prevention, nutrition, physical activity, Tobacco-use cessation, weight loss and cognition.  Checklist reviewing preventive services available has been given to the patient.  Reviewed  patient's diet, addressing concerns and/or questions.   The patient was instructed to see the dentist every 6 months.   She is at risk for psychosocial distress and has been provided with information to reduce risk.          Damián Kimball is a 59 year old, presenting for the following:  Physical        7/25/2024     7:27 AM   Additional Questions   Roomed by Jeaneth DELGADO        Health Care Directive  Patient does not have a Health Care Directive or Living Will: Discussed advance care planning with patient; information given to patient to review.    HPI  Has health anxiety.  Sees friends and family getting sick or dying and makes her worry about her own health.  Has panic attacks, shallow breathing and chest tightness when those occur.  She is interested in therapy.  Feels like her medications do help.  Uses art to calm herself.  Has like literature she shakes and also doodles when she is feeling really anxious.  Work is very stressful.  Working 11 hours a day.  She is the main breadwinner for her family.  She and her  now have her stepdaughter living with them.  For the most part that is going well.    She never tried the vaginal estrogen tablets.  Would like to do so.    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 Does not wear hearing protection always when playing. Encouraged her to do so.           7/25/2024   General Health   How would you rate your overall physical health? Good   Feel stress (tense, anxious, or unable to sleep) Only a little      (!) STRESS CONCERN      7/25/2024   Nutrition   Three or more servings of calcium each day? (!) NO   Diet: Regular (no restrictions)    Vegetarian/vegan   How many servings of fruit and vegetables per day? (!) 2-3   How many sweetened beverages each day? 0-1       Multiple values from one day are sorted in reverse-chronological order         7/25/2024   Exercise   Days per week of moderate/strenous exercise 7 days   Average minutes spent exercising at  this level 40 min            7/25/2024   Social Factors   Frequency of gathering with friends or relatives Twice a week   Worry food won't last until get money to buy more No   Food not last or not have enough money for food? No   Do you have housing? (Housing is defined as stable permanent housing and does not include staying ouside in a car, in a tent, in an abandoned building, in an overnight shelter, or couch-surfing.) Yes   Are you worried about losing your housing? No   Lack of transportation? No   Unable to get utilities (heat,electricity)? No            7/25/2024   Fall Risk   Fallen 2 or more times in the past year? No   Trouble with walking or balance? No             7/25/2024   Dental   Dentist two times every year? (!) NO            7/25/2024   TB Screening   Were you born outside of the [place]? No          Today's PHQ-9 Score:       7/25/2024     7:14 AM   PHQ-9 SCORE   PHQ-9 Total Score MyChart 3 (Minimal depression)   PHQ-9 Total Score 3         7/25/2024   Substance Use   Alcohol more than 3/day or more than 7/wk No   Do you use any other substances recreationally? (!) CANNABIS PRODUCTS        Social History     Tobacco Use    Smoking status: Never     Passive exposure: Never    Smokeless tobacco: Never   Vaping Use    Vaping status: Never Used   Substance Use Topics    Alcohol use: Yes     Comment: 1 beers per day    Drug use: No           5/8/2024   LAST FHS-7 RESULTS   1st degree relative breast or ovarian cancer No   Any relative bilateral breast cancer No   Any male have breast cancer No   Any ONE woman have BOTH breast AND ovarian cancer Yes   Any woman with breast cancer before 50yrs No   2 or more relatives with breast AND/OR ovarian cancer No   2 or more relatives with breast AND/OR bowel cancer No           Mammogram Screening - Mammogram every 1-2 years updated in Health Maintenance based on mutual decision making        7/25/2024   STI Screening   New sexual partner(s) since last STI/HIV  "test? No        History of abnormal Pap smear: No - age 30- 64 PAP with HPV every 5 years recommended        Latest Ref Rng & Units 2023     9:17 AM 2017     1:52 PM 2017     1:16 PM   PAP / HPV   PAP  Unsatisfactory for evaluation      PAP (Historical)    NIL    HPV 16 DNA Negative Negative  Negative     HPV 18 DNA Negative Negative  Negative     Other HR HPV Negative Negative  Negative       ASCVD Risk   The 10-year ASCVD risk score (Brijesh ISABEL, et al., 2019) is: 2.7%    Values used to calculate the score:      Age: 59 years      Sex: Female      Is Non- : No      Diabetic: No      Tobacco smoker: No      Systolic Blood Pressure: 136 mmHg      Is BP treated: No      HDL Cholesterol: 76 mg/dL      Total Cholesterol: 176 mg/dL          Reviewed and updated as needed this visit by Provider                    Past Medical History:   Diagnosis Date    Benign heart murmur     checked in , told benign    CARDIOVASCULAR SCREENING; LDL GOAL LESS THAN 160 2011    Mild major depression (H24) 2011     Past Surgical History:   Procedure Laterality Date    no surgeries       OB History    Para Term  AB Living   2 0 0 0 2 0   SAB IAB Ectopic Multiple Live Births   0 0 0 0 0      # Outcome Date GA Lbr Maxime/2nd Weight Sex Type Anes PTL Lv   2 AB            1 AB                     Objective    Exam  /82   Pulse 59   Temp 97.4  F (36.3  C) (Temporal)   Resp 18   Ht 1.736 m (5' 8.35\")   LMP  (LMP Unknown)   SpO2 98%   BMI 24.08 kg/m     Estimated body mass index is 24.08 kg/m  as calculated from the following:    Height as of this encounter: 1.736 m (5' 8.35\").    Weight as of 23: 72.6 kg (160 lb).    Physical Exam  GENERAL: alert and no distress  EYES: Eyes grossly normal to inspection, PERRL and conjunctivae and sclerae normal  HENT: ear canals and TM's normal, nose and mouth without ulcers or lesions  NECK: no adenopathy, no asymmetry, " masses, or scars  RESP: lungs clear to auscultation - no rales, rhonchi or wheezes  CV: regular rate and rhythm, normal S1 S2, no S3 or S4, no murmur, click or rub, no peripheral edema  ABDOMEN: soft, nontender, no hepatosplenomegaly, no masses and bowel sounds normal   (female) : atrophic female external genitalia, prominent urethral meatus, atrophic vaginal mucosa, and normal cervix   MS: no gross musculoskeletal defects noted, no edema  SKIN: no suspicious lesions or rashes  NEURO: Normal strength and tone, mentation intact and speech normal  PSYCH: mentation appears normal, affect normal/bright        Signed Electronically by: Marcy Mclaughlin MD     Answers submitted by the patient for this visit:  Patient Health Questionnaire (Submitted on 7/25/2024)  If you checked off any problems, how difficult have these problems made it for you to do your work, take care of things at home, or get along with other people?: Somewhat difficult  PHQ9 TOTAL SCORE: 3  JHONY-7 (Submitted on 7/25/2024)  JHONY 7 TOTAL SCORE: 7

## 2024-07-25 NOTE — LETTER
August 19, 2024      Rehana LOO Obed Farhad  [address]  [facility] [address]        Dear Ms.Anthony Llamas,    We are writing to inform you of your test results.    The results of your recent lipid (cholesterol) profile were normal.     Desired or goal lipid levels are:   CHOLESTEROL: Desirable is less than 200.   HDL (Good Cholesterol): Desirable is greater than 40 in men and greater than 50 in women.   LDL (Bad Cholesterol): Desirable is less than 130 or less than 100 in patients with diabetes or vascular disease. For some patients with diabetes or vascular disease, the desireable LDL is less than 70.   TRIGLYCERIDES: Desirable is less than 150.     Keep up the good work! Below is some more information on maintaining normal lipids.     As you may know, an elevated cholesterol is one factor that increases your risk for heart disease and stroke.  You can improve your cholesterol by controlling the amount and type of fat you eat, and by increasing your daily activity level.     Here are some ways to improve your nutrition (adapted from [employer] handout):          *   Eat less fat (especially butter, Crisco, and other saturated fats)        *   Buy lean cuts of meat, reduce your portions of red meat, or             substitute poultry or fish        *   Use skim milk and low-fat dairy products        *   Eat no more than 4 egg yokes per week        *   Avoid fried or fast foods that are high in fat        *   Eat more fruits and vegetables     Aerobic activity is the best way to improve HDL (good) cholesterol.     **Your vitamin D level was low.  I recommend starting a vitamin D supplement 2000 international units daily.     Your thyroid test was normal.     The testing of your blood sugar, kidney function, liver function and electrolytes was normal.     Your blood counts were normal.       Please feel free to call me with any questions, or if you would like more information.       Resulted Orders   Lipid panel  reflex to direct LDL Fasting   Result Value Ref Range    Cholesterol 166 <200 mg/dL    Triglycerides 64 <150 mg/dL    Direct Measure HDL 59 >=50 mg/dL    LDL Cholesterol Calculated 94 <=100 mg/dL    Non HDL Cholesterol 107 <130 mg/dL    Patient Fasting > 8hrs? Yes     Narrative    Cholesterol  Desirable:  <200 mg/dL    Triglycerides  Normal:  Less than 150 mg/dL  Borderline High:  150-199 mg/dL  High:  200-499 mg/dL  Very High:  Greater than or equal to 500 mg/dL    Direct Measure HDL  Female:  Greater than or equal to 50 mg/dL   Male:  Greater than or equal to 40 mg/dL    LDL Cholesterol  Desirable:  <100mg/dL  Above Desirable:  100-129 mg/dL   Borderline High:  130-159 mg/dL   High:  160-189 mg/dL   Very High:  >= 190 mg/dL    Non HDL Cholesterol  Desirable:  130 mg/dL  Above Desirable:  130-159 mg/dL  Borderline High:  160-189 mg/dL  High:  190-219 mg/dL  Very High:  Greater than or equal to 220 mg/dL   Comprehensive metabolic panel (BMP + Alb, Alk Phos, ALT, AST, Total. Bili, TP)   Result Value Ref Range    Sodium 142 135 - 145 mmol/L    Potassium 4.2 3.4 - 5.3 mmol/L    Carbon Dioxide (CO2) 28 22 - 29 mmol/L    Anion Gap 9 7 - 15 mmol/L    Urea Nitrogen 12.6 8.0 - 23.0 mg/dL    Creatinine 0.75 0.51 - 0.95 mg/dL    GFR Estimate >90 >60 mL/min/1.73m2      Comment:      eGFR calculated using 2021 CKD-EPI equation.    Calcium 9.3 8.8 - 10.4 mg/dL      Comment:      Reference intervals for this test were updated on 7/16/2024 to reflect our healthy population more accurately. There may be differences in the flagging of prior results with similar values performed with this method. Those prior results can be interpreted in the context of the updated reference intervals.    Chloride 105 98 - 107 mmol/L    Glucose 97 70 - 99 mg/dL    Alkaline Phosphatase 64 40 - 150 U/L    AST 25 0 - 45 U/L    ALT 18 0 - 50 U/L    Protein Total 7.2 6.4 - 8.3 g/dL    Albumin 4.1 3.5 - 5.2 g/dL    Bilirubin Total 0.4 <=1.2 mg/dL    Patient  Fasting > 8hrs? Yes    TSH with free T4 reflex   Result Value Ref Range    TSH 1.07 0.30 - 4.20 uIU/mL   Vitamin D Deficiency   Result Value Ref Range    Vitamin D, Total (25-Hydroxy) 18 (L) 20 - 50 ng/mL      Comment:      mild to moderate deficiency    Narrative    Season, race, dietary intake, and treatment affect the concentration of 25-hydroxy-Vitamin D. Values may decrease during winter months and increase during summer months.    Vitamin D determination is routinely performed by an immunoassay specific for 25 hydroxyvitamin D3.  If an individual is on vitamin D2(ergocalciferol) supplementation, please specify 25 OH vitamin D2 and D3 level determination by LCMSMS test [id number].     CBC with platelets and differential   Result Value Ref Range    WBC Count 4.3 4.0 - 11.0 10e3/uL    RBC Count 4.28 3.80 - 5.20 10e6/uL    Hemoglobin 13.1 11.7 - 15.7 g/dL    Hematocrit 39.8 35.0 - 47.0 %    MCV 93 78 - 100 fL    MCH 30.6 26.5 - 33.0 pg    MCHC 32.9 31.5 - 36.5 g/dL    RDW 11.8 10.0 - 15.0 %    Platelet Count 178 150 - 450 10e3/uL    % Neutrophils 61 %    % Lymphocytes 24 %    % Monocytes 11 %    % Eosinophils 3 %    % Basophils 1 %    % Immature Granulocytes 0 %    Absolute Neutrophils 2.6 1.6 - 8.3 10e3/uL    Absolute Lymphocytes 1.0 0.8 - 5.3 10e3/uL    Absolute Monocytes 0.5 0.0 - 1.3 10e3/uL    Absolute Eosinophils 0.1 0.0 - 0.7 10e3/uL    Absolute Basophils 0.0 0.0 - 0.2 10e3/uL    Absolute Immature Granulocytes 0.0 <=0.4 10e3/uL       If you have any questions or concerns, please call the clinic at the number listed above.       Sincerely,    Marcy Mclaughlin MD/allan

## 2024-07-31 LAB
BKR LAB AP GYN ADEQUACY: NORMAL
BKR LAB AP GYN INTERPRETATION: NORMAL
BKR LAB AP PREVIOUS ABNORMAL: NORMAL
HPV HR 12 DNA CVX QL NAA+PROBE: NEGATIVE
HPV16 DNA CVX QL NAA+PROBE: NEGATIVE
HPV18 DNA CVX QL NAA+PROBE: NEGATIVE
HUMAN PAPILLOMA VIRUS FINAL DIAGNOSIS: NORMAL
PATH REPORT.COMMENTS IMP SPEC: NORMAL
PATH REPORT.COMMENTS IMP SPEC: NORMAL
PATH REPORT.RELEVANT HX SPEC: NORMAL

## 2024-09-26 LAB — NONINV COLON CA DNA+OCC BLD SCRN STL QL: NORMAL

## 2024-10-11 LAB — NONINV COLON CA DNA+OCC BLD SCRN STL QL: NORMAL

## 2025-06-25 ENCOUNTER — PATIENT OUTREACH (OUTPATIENT)
Dept: CARE COORDINATION | Facility: CLINIC | Age: 60
End: 2025-06-25
Payer: COMMERCIAL

## 2025-06-25 DIAGNOSIS — F41.1 GAD (GENERALIZED ANXIETY DISORDER): ICD-10-CM

## 2025-06-25 RX ORDER — BUSPIRONE HYDROCHLORIDE 15 MG/1
15 TABLET ORAL 2 TIMES DAILY
Qty: 180 TABLET | Refills: 0 | Status: SHIPPED | OUTPATIENT
Start: 2025-06-25

## 2025-06-25 NOTE — TELEPHONE ENCOUNTER
"Rehana Llamas to [facility] (supporting Marcy Mclaughlin MD)        6/24/25  8:19 PM  \"I am having troubles getting mu Busprone refilled. I am concerned I only have one week left of it and since [facility] won't refill I thought I would follow up with Dr. Mclaughlin.   Thank you\"  "

## 2025-07-13 ENCOUNTER — HEALTH MAINTENANCE LETTER (OUTPATIENT)
Age: 60
End: 2025-07-13

## 2025-07-17 DIAGNOSIS — F32.0 MILD MAJOR DEPRESSION: ICD-10-CM

## 2025-07-17 DIAGNOSIS — F41.1 GAD (GENERALIZED ANXIETY DISORDER): ICD-10-CM

## 2025-07-17 RX ORDER — ESCITALOPRAM OXALATE 20 MG/1
20 TABLET ORAL DAILY
Qty: 90 TABLET | Refills: 3 | Status: SHIPPED | OUTPATIENT
Start: 2025-07-17

## 2025-08-12 ENCOUNTER — OFFICE VISIT (OUTPATIENT)
Dept: INTERNAL MEDICINE | Facility: CLINIC | Age: 60
End: 2025-08-12
Payer: COMMERCIAL

## 2025-08-12 VITALS
WEIGHT: 173 LBS | DIASTOLIC BLOOD PRESSURE: 92 MMHG | SYSTOLIC BLOOD PRESSURE: 132 MMHG | BODY MASS INDEX: 26.22 KG/M2 | TEMPERATURE: 100.7 F | HEIGHT: 68 IN | HEART RATE: 79 BPM | RESPIRATION RATE: 12 BRPM | OXYGEN SATURATION: 98 %

## 2025-08-12 DIAGNOSIS — F41.1 GAD (GENERALIZED ANXIETY DISORDER): ICD-10-CM

## 2025-08-12 DIAGNOSIS — R07.9 CHEST PAIN, UNSPECIFIED TYPE: Primary | ICD-10-CM

## 2025-08-12 DIAGNOSIS — F41.0 PANIC DISORDER WITHOUT AGORAPHOBIA: ICD-10-CM

## 2025-08-12 PROCEDURE — 3079F DIAST BP 80-89 MM HG: CPT | Performed by: STUDENT IN AN ORGANIZED HEALTH CARE EDUCATION/TRAINING PROGRAM

## 2025-08-12 PROCEDURE — 3075F SYST BP GE 130 - 139MM HG: CPT | Performed by: STUDENT IN AN ORGANIZED HEALTH CARE EDUCATION/TRAINING PROGRAM

## 2025-08-12 PROCEDURE — 99214 OFFICE O/P EST MOD 30 MIN: CPT | Performed by: STUDENT IN AN ORGANIZED HEALTH CARE EDUCATION/TRAINING PROGRAM

## 2025-08-12 RX ORDER — GABAPENTIN 300 MG/1
300 CAPSULE ORAL 3 TIMES DAILY
Qty: 90 CAPSULE | Refills: 0 | Status: SHIPPED | OUTPATIENT
Start: 2025-08-12

## 2025-08-12 RX ORDER — CLONAZEPAM 0.5 MG/1
0.5 TABLET ORAL DAILY PRN
Qty: 10 TABLET | Refills: 0 | Status: SHIPPED | OUTPATIENT
Start: 2025-08-12

## 2025-08-12 ASSESSMENT — ANXIETY QUESTIONNAIRES
GAD7 TOTAL SCORE: 15
3. WORRYING TOO MUCH ABOUT DIFFERENT THINGS: NEARLY EVERY DAY
4. TROUBLE RELAXING: MORE THAN HALF THE DAYS
1. FEELING NERVOUS, ANXIOUS, OR ON EDGE: NEARLY EVERY DAY
6. BECOMING EASILY ANNOYED OR IRRITABLE: SEVERAL DAYS
7. FEELING AFRAID AS IF SOMETHING AWFUL MIGHT HAPPEN: MORE THAN HALF THE DAYS
7. FEELING AFRAID AS IF SOMETHING AWFUL MIGHT HAPPEN: MORE THAN HALF THE DAYS
GAD7 TOTAL SCORE: 15
GAD7 TOTAL SCORE: 15
IF YOU CHECKED OFF ANY PROBLEMS ON THIS QUESTIONNAIRE, HOW DIFFICULT HAVE THESE PROBLEMS MADE IT FOR YOU TO DO YOUR WORK, TAKE CARE OF THINGS AT HOME, OR GET ALONG WITH OTHER PEOPLE: SOMEWHAT DIFFICULT
5. BEING SO RESTLESS THAT IT IS HARD TO SIT STILL: SEVERAL DAYS
2. NOT BEING ABLE TO STOP OR CONTROL WORRYING: NEARLY EVERY DAY
8. IF YOU CHECKED OFF ANY PROBLEMS, HOW DIFFICULT HAVE THESE MADE IT FOR YOU TO DO YOUR WORK, TAKE CARE OF THINGS AT HOME, OR GET ALONG WITH OTHER PEOPLE?: SOMEWHAT DIFFICULT

## 2025-08-12 ASSESSMENT — PATIENT HEALTH QUESTIONNAIRE - PHQ9
10. IF YOU CHECKED OFF ANY PROBLEMS, HOW DIFFICULT HAVE THESE PROBLEMS MADE IT FOR YOU TO DO YOUR WORK, TAKE CARE OF THINGS AT HOME, OR GET ALONG WITH OTHER PEOPLE: NOT DIFFICULT AT ALL
SUM OF ALL RESPONSES TO PHQ QUESTIONS 1-9: 0
SUM OF ALL RESPONSES TO PHQ QUESTIONS 1-9: 0

## 2025-08-12 ASSESSMENT — ENCOUNTER SYMPTOMS: NERVOUS/ANXIOUS: 1

## 2025-09-02 ENCOUNTER — MYC MEDICAL ADVICE (OUTPATIENT)
Dept: FAMILY MEDICINE | Facility: CLINIC | Age: 60
End: 2025-09-02
Payer: COMMERCIAL

## 2025-09-02 DIAGNOSIS — F41.0 PANIC DISORDER WITHOUT AGORAPHOBIA: ICD-10-CM

## 2025-09-02 DIAGNOSIS — F41.1 GAD (GENERALIZED ANXIETY DISORDER): ICD-10-CM

## 2025-09-03 RX ORDER — GABAPENTIN 300 MG/1
300 CAPSULE ORAL 3 TIMES DAILY
Qty: 90 CAPSULE | Refills: 0 | Status: SHIPPED | OUTPATIENT
Start: 2025-09-03

## 2025-09-03 RX ORDER — CLONAZEPAM 0.5 MG/1
0.5 TABLET ORAL DAILY PRN
Qty: 10 TABLET | Refills: 0 | OUTPATIENT
Start: 2025-09-03